# Patient Record
Sex: FEMALE | Race: WHITE | Employment: FULL TIME | URBAN - METROPOLITAN AREA
[De-identification: names, ages, dates, MRNs, and addresses within clinical notes are randomized per-mention and may not be internally consistent; named-entity substitution may affect disease eponyms.]

---

## 2018-01-18 LAB — HIV AG/AB: NORMAL

## 2020-01-28 ENCOUNTER — OFFICE VISIT (OUTPATIENT)
Dept: FAMILY MEDICINE CLINIC | Age: 35
End: 2020-01-28
Payer: COMMERCIAL

## 2020-01-28 VITALS
HEIGHT: 66 IN | RESPIRATION RATE: 16 BRPM | WEIGHT: 232 LBS | DIASTOLIC BLOOD PRESSURE: 70 MMHG | BODY MASS INDEX: 37.28 KG/M2 | HEART RATE: 100 BPM | OXYGEN SATURATION: 98 % | SYSTOLIC BLOOD PRESSURE: 108 MMHG

## 2020-01-28 DIAGNOSIS — Z00.00 PHYSICAL EXAM, ANNUAL: ICD-10-CM

## 2020-01-28 PROBLEM — B17.10 ACUTE HEPATITIS C VIRUS INFECTION WITHOUT HEPATIC COMA: Status: ACTIVE | Noted: 2020-01-28

## 2020-01-28 PROBLEM — F19.11 H/O DRUG ABUSE (HCC): Status: ACTIVE | Noted: 2020-01-28

## 2020-01-28 LAB
A/G RATIO: 1 (ref 1.1–2.2)
ALBUMIN SERPL-MCNC: 3.8 G/DL (ref 3.4–5)
ALP BLD-CCNC: 136 U/L (ref 40–129)
ALT SERPL-CCNC: 37 U/L (ref 10–40)
ANION GAP SERPL CALCULATED.3IONS-SCNC: 13 MMOL/L (ref 3–16)
AST SERPL-CCNC: 59 U/L (ref 15–37)
BASOPHILS ABSOLUTE: 0.1 K/UL (ref 0–0.2)
BASOPHILS RELATIVE PERCENT: 1 %
BILIRUB SERPL-MCNC: 0.3 MG/DL (ref 0–1)
BUN BLDV-MCNC: 7 MG/DL (ref 7–20)
CALCIUM SERPL-MCNC: 8.7 MG/DL (ref 8.3–10.6)
CHLORIDE BLD-SCNC: 100 MMOL/L (ref 99–110)
CHOLESTEROL, TOTAL: 192 MG/DL (ref 0–199)
CO2: 22 MMOL/L (ref 21–32)
CREAT SERPL-MCNC: 0.6 MG/DL (ref 0.6–1.1)
EOSINOPHILS ABSOLUTE: 0.4 K/UL (ref 0–0.6)
EOSINOPHILS RELATIVE PERCENT: 5.4 %
GFR AFRICAN AMERICAN: >60
GFR NON-AFRICAN AMERICAN: >60
GLOBULIN: 3.9 G/DL
GLUCOSE BLD-MCNC: 81 MG/DL (ref 70–99)
HCT VFR BLD CALC: 37.6 % (ref 36–48)
HDLC SERPL-MCNC: 54 MG/DL (ref 40–60)
HEMOGLOBIN: 12.3 G/DL (ref 12–16)
LDL CHOLESTEROL CALCULATED: 117 MG/DL
LYMPHOCYTES ABSOLUTE: 1.4 K/UL (ref 1–5.1)
LYMPHOCYTES RELATIVE PERCENT: 18.6 %
MCH RBC QN AUTO: 25.6 PG (ref 26–34)
MCHC RBC AUTO-ENTMCNC: 32.6 G/DL (ref 31–36)
MCV RBC AUTO: 78.4 FL (ref 80–100)
MONOCYTES ABSOLUTE: 0.4 K/UL (ref 0–1.3)
MONOCYTES RELATIVE PERCENT: 5.9 %
NEUTROPHILS ABSOLUTE: 5.1 K/UL (ref 1.7–7.7)
NEUTROPHILS RELATIVE PERCENT: 69.1 %
PDW BLD-RTO: 14.7 % (ref 12.4–15.4)
PLATELET # BLD: 251 K/UL (ref 135–450)
PMV BLD AUTO: 9.9 FL (ref 5–10.5)
POTASSIUM SERPL-SCNC: 4.1 MMOL/L (ref 3.5–5.1)
RBC # BLD: 4.8 M/UL (ref 4–5.2)
SODIUM BLD-SCNC: 135 MMOL/L (ref 136–145)
TOTAL PROTEIN: 7.7 G/DL (ref 6.4–8.2)
TRIGL SERPL-MCNC: 105 MG/DL (ref 0–150)
VLDLC SERPL CALC-MCNC: 21 MG/DL
WBC # BLD: 7.4 K/UL (ref 4–11)

## 2020-01-28 PROCEDURE — G8484 FLU IMMUNIZE NO ADMIN: HCPCS | Performed by: FAMILY MEDICINE

## 2020-01-28 PROCEDURE — 99385 PREV VISIT NEW AGE 18-39: CPT | Performed by: FAMILY MEDICINE

## 2020-01-28 RX ORDER — VENLAFAXINE 100 MG/1
100 TABLET ORAL 2 TIMES DAILY
Qty: 60 TABLET | Refills: 0 | Status: SHIPPED | OUTPATIENT
Start: 2020-01-28 | End: 2020-02-13

## 2020-01-28 RX ORDER — BUPRENORPHINE HYDROCHLORIDE AND NALOXONE HYDROCHLORIDE DIHYDRATE 8; 2 MG/1; MG/1
1 TABLET SUBLINGUAL DAILY
COMMUNITY

## 2020-01-28 RX ORDER — DICYCLOMINE HYDROCHLORIDE 10 MG/1
10 CAPSULE ORAL
COMMUNITY
End: 2021-03-18 | Stop reason: SDUPTHER

## 2020-01-28 RX ORDER — OMEPRAZOLE 40 MG/1
CAPSULE, DELAYED RELEASE ORAL
COMMUNITY
Start: 2020-01-20 | End: 2020-10-23

## 2020-01-28 RX ORDER — MONTELUKAST SODIUM 4 MG/1
1 TABLET, CHEWABLE ORAL 2 TIMES DAILY
COMMUNITY
End: 2022-06-05

## 2020-01-28 RX ORDER — BUPROPION HYDROCHLORIDE 150 MG/1
TABLET ORAL
COMMUNITY
Start: 2020-01-20 | End: 2020-01-28

## 2020-01-28 RX ORDER — TAMSULOSIN HYDROCHLORIDE 0.4 MG/1
CAPSULE ORAL
COMMUNITY
Start: 2020-01-05 | End: 2020-01-28

## 2020-01-28 RX ORDER — VENLAFAXINE 100 MG/1
TABLET ORAL
COMMUNITY
Start: 2020-01-25 | End: 2020-01-28 | Stop reason: SDUPTHER

## 2020-01-28 RX ORDER — ALBUTEROL SULFATE 90 UG/1
2 AEROSOL, METERED RESPIRATORY (INHALATION) EVERY 6 HOURS PRN
COMMUNITY
End: 2020-10-12 | Stop reason: SDUPTHER

## 2020-01-28 ASSESSMENT — PATIENT HEALTH QUESTIONNAIRE - PHQ9
1. LITTLE INTEREST OR PLEASURE IN DOING THINGS: 0
SUM OF ALL RESPONSES TO PHQ QUESTIONS 1-9: 0
SUM OF ALL RESPONSES TO PHQ9 QUESTIONS 1 & 2: 0
2. FEELING DOWN, DEPRESSED OR HOPELESS: 0
SUM OF ALL RESPONSES TO PHQ QUESTIONS 1-9: 0

## 2020-01-28 NOTE — PROGRESS NOTES
Chief Complaint: New Patient and Irritable Bowel Syndrome       HPI: She is here to establish care. Previously she had history of IV drug abuse. But she quotes that she has been sober for more than 2 years. Her previous PCP dismissed her but she does not give clear reasoning. She states that she is on probation for child custody. And they check her urine for uds . She is unable to urinate and was unable to give them sample. She states that she hardly urinates and always been having difficulty to urinate. Her previous PCP wrote a letter to court that she was unable to urinate. She requests similar kind of her letter from me. Her labs from 2019 May have been normal.  No known kidney disease. Has history of hepatitis C and was getting acute treatment she wanted new GI doctor to continue the treatment as her previous doctor would not accept her insurance anymore. Has history of anxiety and depression and has been taking Effexor 100 mg twice a day. And she was taking Wellbutrin but she has been tapering the medication. She is currently going to [facility] rehab and she is also getting Suboxone. Patient's problem list, medications, allergies, past medical, surgical, social and family histories were reviewed and updated as appropriate. Current Outpatient Medications   Medication Sig Dispense Refill    omeprazole (PRILOSEC) 40 MG delayed release capsule TK 1 C PO D      colestipol (COLESTID) 1 g tablet Take 1 g by mouth 2 times daily      dicyclomine (BENTYL) 10 MG capsule Take 10 mg by mouth 4 times daily (before meals and nightly)      albuterol sulfate  (90 Base) MCG/ACT inhaler Inhale 2 puffs into the lungs every 6 hours as needed for Wheezing      buprenorphine-naloxone (SUBOXONE) 8-2 MG SUBL SL tablet Place 1 tablet under the tongue daily.       venlafaxine (EFFEXOR) 100 MG tablet Take 1 tablet by mouth 2 times daily 60 tablet 0    ibuprofen (IBU) 800 MG tablet Take 1 tablet by GI given  Javon Suarez  1/28/2020 3:17 PM

## 2020-02-01 ENCOUNTER — PATIENT MESSAGE (OUTPATIENT)
Dept: FAMILY MEDICINE CLINIC | Age: 35
End: 2020-02-01

## 2020-02-03 NOTE — TELEPHONE ENCOUNTER
From: Aylin Castellanos  To: Edelmira Hansen MD  Sent: 2/1/2020 2:32 PM EST  Subject: Test Results Question    I have a question about CBC W/ AUTO DIFFERENTIAL resulted on 1/28/20 at 8:20 PM. I seen that there is a few results that are low or borderline low and a few chester high levels, between all 3 blood test results.  Does the doc see any concerns with my results due to a few of my underlying symptoms and health issues

## 2020-02-13 RX ORDER — VENLAFAXINE 100 MG/1
100 TABLET ORAL 2 TIMES DAILY
Qty: 60 TABLET | Refills: 0 | Status: SHIPPED | OUTPATIENT
Start: 2020-02-13 | End: 2020-02-13 | Stop reason: SDUPTHER

## 2020-02-13 RX ORDER — VENLAFAXINE 100 MG/1
200 TABLET ORAL 2 TIMES DAILY
Qty: 20 TABLET | Refills: 0 | Status: SHIPPED | OUTPATIENT
Start: 2020-02-13 | End: 2020-02-14 | Stop reason: SDUPTHER

## 2020-02-19 ENCOUNTER — PATIENT MESSAGE (OUTPATIENT)
Dept: FAMILY MEDICINE CLINIC | Age: 35
End: 2020-02-19

## 2020-02-19 NOTE — TELEPHONE ENCOUNTER
From: Dipak Monge  To: Allison Kenyon MD  Sent: 2/19/2020 12:39 PM EST  Subject: Test Results Question      Thank you so much I had no clue. Havent heard from the pharmacy   ----- Message -----  From: Eamon Doveroche  Sent: 2/19/20 12:35 PM  To: Dipak Monge  Subject: RE: Test Results Question    [facility] -   This has been taken care of- please contact your pharmacy  :   venlafaxine [facility]) 100 MG tablet 120 tablet  2/14/2020    Sig: Take 2 tablets 2 times a day   Sent to pharmacy as: Venlafaxine HCl 100 MG Oral Tablet [facility])   E-Prescribing Status: Receipt confirmed by pharmacy (2/14/2020  3:17 PM EST)   Pharmacy: [facility], [address] [place] [address] [phone] [facility] [phone]          ----- Message -----   From: Dipak Monge   Sent: 2/19/2020 12:24 PM EST   To: Allison Kenyon MD  Subject: RE: Test Results Question      My 100mg effexor I take 2 times a day, 200mg morning and 200mg night. The script was wrote to the pharmacy wrong and I ran out way before I was sopost to. The pharmacy cant refill unless the script is fixed. It's the doses I have been taking for over a year and that my prior primary care at [facility] had continued to subscribe also. I am out of my script and its critical to not go cold turkey on this type of medication please  ----- Message -----  From: Allison Kenyon MD  Sent: 2/3/20 1:18 PM  To: Dipak Monge  Subject: RE: Test Results Question    Hi Ms Winston Stendal,    The sodium slightly low and can happen if you drink more water or happened to drink alcohol. And its not critically low.   And other blood work was normal too and did not concern with your underlying symptoms    Dr Mikhail Zapien      ----- Message -----   From: Dipak Monge   Sent: 2/1/2020 2:32 PM EST   To: Allison Kenyon MD  Subject: RE: Test Results Question    I have a question about CBC W/ AUTO DIFFERENTIAL resulted on 1/28/20 at 8:20 PM. I seen that there is a few results that are low or borderline low and a few chester high levels, between all 3 blood test results.  Does the doc see any concerns with my results due to a few of my underlying symptoms and health issues

## 2020-03-18 RX ORDER — VENLAFAXINE 100 MG/1
TABLET ORAL
Qty: 120 TABLET | Refills: 0 | Status: SHIPPED | OUTPATIENT
Start: 2020-03-18 | End: 2020-04-06 | Stop reason: SDUPTHER

## 2020-03-18 NOTE — TELEPHONE ENCOUNTER
Last office visit 1/28/2020     Last written 2/14/2020    Next office visit scheduled Visit date not found    Requested Prescriptions     Pending Prescriptions Disp Refills    venlafaxine (EFFEXOR) 100 MG tablet 120 tablet 0     Sig: Take 2 tablets 2 times a day

## 2020-04-06 ENCOUNTER — PATIENT MESSAGE (OUTPATIENT)
Dept: FAMILY MEDICINE CLINIC | Age: 35
End: 2020-04-06

## 2020-04-06 RX ORDER — VENLAFAXINE 100 MG/1
TABLET ORAL
Qty: 120 TABLET | Refills: 0 | Status: SHIPPED | OUTPATIENT
Start: 2020-04-06 | End: 2020-04-17

## 2020-04-06 NOTE — TELEPHONE ENCOUNTER
Medication:   Requested Prescriptions     Pending Prescriptions Disp Refills    venlafaxine (EFFEXOR) 100 MG tablet 120 tablet 0     Sig: Take 2 tablets 2 times a day        Last Filled:  3/18/2020 120 tab 0 refills     Patient Phone Number: [phone] (home)     Last appt: 1/28/2020   Next appt: Visit date not found    Last OARRS: No flowsheet data found.

## 2020-04-17 RX ORDER — VENLAFAXINE 100 MG/1
TABLET ORAL
Qty: 60 TABLET | Refills: 0 | Status: SHIPPED | OUTPATIENT
Start: 2020-04-17 | End: 2020-06-01

## 2020-06-01 RX ORDER — VENLAFAXINE 100 MG/1
TABLET ORAL
Qty: 120 TABLET | Refills: 0 | Status: SHIPPED | OUTPATIENT
Start: 2020-06-01 | End: 2020-06-29

## 2020-06-16 ENCOUNTER — PATIENT MESSAGE (OUTPATIENT)
Dept: FAMILY MEDICINE CLINIC | Age: 35
End: 2020-06-16

## 2020-06-17 NOTE — TELEPHONE ENCOUNTER
From: Fabiano Hester  To: Lien Carrillo MD  Sent: 6/16/2020 10:47 PM EDT  Subject: Non-Urgent Medical Question    I'm not feeling too well, sweating, nauseous, body aches and loose bowels. It hit me out of nowhere around 8pm tonight. I have not been tested for covid and I've been around alot of people lately due to baseball games and working. Can I get tested as soon as possible?

## 2020-06-29 RX ORDER — VENLAFAXINE 100 MG/1
TABLET ORAL
Qty: 120 TABLET | Refills: 0 | Status: SHIPPED | OUTPATIENT
Start: 2020-06-29 | End: 2020-07-27

## 2020-07-27 RX ORDER — VENLAFAXINE 100 MG/1
TABLET ORAL
Qty: 120 TABLET | Refills: 0 | Status: SHIPPED | OUTPATIENT
Start: 2020-07-27 | End: 2020-08-26

## 2020-07-27 NOTE — TELEPHONE ENCOUNTER
Medication:   Requested Prescriptions     Pending Prescriptions Disp Refills    venlafaxine (EFFEXOR) 100 MG tablet [Pharmacy Med Name: VENLAFAXINE  MG TABLET] 120 tablet 0     Sig: TAKE 2 TABLETS BY MOUTH TWICE A DAY        Last Filled:  6/29/2020 #120 w/0 refills    Patient Phone Number: [phone] (home)     Last appt: 1/28/2020   Next appt: Visit date not found    Last OARRS: No flowsheet data found.

## 2020-08-26 ENCOUNTER — PATIENT MESSAGE (OUTPATIENT)
Dept: FAMILY MEDICINE CLINIC | Age: 35
End: 2020-08-26

## 2020-08-26 RX ORDER — VENLAFAXINE 100 MG/1
TABLET ORAL
Qty: 120 TABLET | Refills: 0 | Status: SHIPPED | OUTPATIENT
Start: 2020-08-26 | End: 2020-09-09 | Stop reason: SDUPTHER

## 2020-08-26 NOTE — TELEPHONE ENCOUNTER
From: Mounika Sargent  To: ODETTE Ulrich  Sent: 8/26/2020 5:06 PM EDT  Subject: Appointment Reminder    Not the wellbutrin. It was Trazadone.       ----- Message -----   From:ODETTE Ulrich   Sent:8/26/2020 4:13 PM EDT   To:Sara Alfred Nyhan   Subject:RE: Appointment Reminder    Good afternoon LAHTI,   I have you scheduled for a video visit with Dr. Elsi Arriaga on Friday August 28,2020 at 2:45. I will call you a few minutes before your appointment to get you check in and explain on how to connect to the video visit. I have sent your prescription for the Effexor over to Dr. Yvonne Prabhakar approval. I have looked into your medications and I wasn't able to find the Trazodone 150 mg. The only prescription I found that was 150 mg was Bupropion (Wellbutrin). Did you mean Wellbutrin? Please let me know if you need to re schedule and if you meant the Wellbutrin. Have a great day! Thanks,    Breanna Dawson C.C.M.A., [address] [facility] Assistant for:     [facility], [address]  [place], [address]    P: [phone]  F: [phone]      ----- Message -----   From:Sara Alfred Nyhan   Sent:8/26/2020 1:35 PM EDT   To:ODETTE Sanchezkeley   Subject:RE: Appointment Reminder    Video visit would be best but whatever works with her schedule best because I would like to ask her to prescribe me my 150mg trazadone again. I don't take it every night I usually take it when needed to sleep. It works great but I ran out about a month ago and now having trouble staying asleep. When I seen her on my first visit I said I didn't need them refilled since I had some already but I would like them back please.      ----- Message -----   From:ODETTE REAGAN   Sent:8/26/2020 8:26 AM EDT   To:Sara Alfred Nyhan   Subject:RE: Appointment Reminder    Good morning LAHTI,  Would you like an office visit, or a video visit?     Thanks,  Иван Baumann      ----- Message -----   From:Gregoria Glover   Sent:8/26/2020 8:12 AM

## 2020-08-28 ENCOUNTER — VIRTUAL VISIT (OUTPATIENT)
Dept: FAMILY MEDICINE CLINIC | Age: 35
End: 2020-08-28
Payer: COMMERCIAL

## 2020-08-28 PROCEDURE — G8427 DOCREV CUR MEDS BY ELIG CLIN: HCPCS | Performed by: FAMILY MEDICINE

## 2020-08-28 PROCEDURE — 99213 OFFICE O/P EST LOW 20 MIN: CPT | Performed by: FAMILY MEDICINE

## 2020-08-28 RX ORDER — TRAZODONE HYDROCHLORIDE 50 MG/1
100 TABLET ORAL NIGHTLY PRN
Qty: 60 TABLET | Refills: 5 | Status: SHIPPED | OUTPATIENT
Start: 2020-08-28 | End: 2021-03-08

## 2020-08-28 NOTE — PROGRESS NOTES
2020    TELEHEALTH EVALUATION -- Audio/Visual (During JFVTJ-19 public health emergency)    HPI:    Won Selby (:  1985) has requested an audio/video evaluation for the following concern(s): She is here for insomnia. She was taking trazodone in the past to help her sleep  And now she is not taking  It is affecting her sleep  She has been taking Effexor 100 mg twice a day for her anxiety and depression  Symptoms are well controlled denies any concern    She wanted to restart trazodone. Review of Systems:  Gen:  Denies fever, chills, headaches. No weight loss  HEENT:  Denies cold symptoms, sore throat. CV:  Denies chest pain or tightness, palpitations. Pulm:  Denies shortness of breath, cough. Abd:  Denies abdominal pain, change in bowel habits. Prior to Visit Medications    Medication Sig Taking? Authorizing Provider   traZODone (DESYREL) 50 MG tablet Take 2 tablets by mouth nightly as needed for Sleep Yes Dipak Shepherd MD   venlafaxine (EFFEXOR) 100 MG tablet TAKE 2 TABLETS BY MOUTH TWICE A DAY Yes Antonino Walls MD   omeprazole (PRILOSEC) 40 MG delayed release capsule TK 1 C PO D Yes Historical Provider, MD   colestipol (COLESTID) 1 g tablet Take 1 g by mouth 2 times daily Yes Historical Provider, MD   dicyclomine (BENTYL) 10 MG capsule Take 10 mg by mouth 4 times daily (before meals and nightly) Yes Historical Provider, MD   albuterol sulfate  (90 Base) MCG/ACT inhaler Inhale 2 puffs into the lungs every 6 hours as needed for Wheezing Yes Historical Provider, MD   buprenorphine-naloxone (SUBOXONE) 8-2 MG SUBL SL tablet Place 1 tablet under the tongue daily. Yes Historical Provider, MD   ibuprofen (IBU) 800 MG tablet Take 1 tablet by mouth every 8 hours as needed for Pain.   Ricardo Guan MD       Past Medical History:   Diagnosis Date    Hepatitis C     Heroin abuse (Eliana Alexandra)     IBS (irritable bowel syndrome)     PID        Past Surgical History:   Procedure Laterality Date    CHOLECYSTECTOMY      TUBAL LIGATION         Family History   Problem Relation Age of Onset    Hypothyroidism Mother     Diabetes Father     Heart Disease Father     High Blood Pressure Father     High Cholesterol Father        Allergies   Allergen Reactions    Acetaminophen      Hep C    Hydrocodone-Acetaminophen Itching    Prednisone Other (See Comments)     Per patient- all over body pain. Social History     Tobacco Use    Smoking status: Former Smoker     Packs/day: 0.50     Types: Cigarettes    Smokeless tobacco: Never Used   Substance Use Topics    Alcohol use: No    Drug use: No          PHYSICAL EXAMINATION:  Vital Signs:not checked    Respiratory rate appears normal      Constitutional: Appears well-developed and well-nourished. No apparent distress    Mental status: Alert and awake. Oriented to person/place/time. Able to follow commands    Eyes: EOM normal. Sclera normal. No discharge visible  HENT: Normocephalic, atraumatic. Mouth/Throat: Mucous membranes are moist. External Ears Normal    Neck: No visualized mass   Pulmonary/Chest: Respiratory effort normal.  No visualized signs of difficulty breathing or respiratory distress        Musculoskeletal:  Normal range of motion of neck    Psychiatric: Normal Affect. No Hallucinations            ASSESSMENT/PLAN:  1. Primary insomnia  Started on trazodone 100 mg daily     2 Anxiety and depression  Stable and we will continue the same        Pierre Washington is a 30 y.o. female being evaluated by a Virtual Visit (video visit) encounter to address concerns as mentioned above. A caregiver was present when appropriate. Due to this being a TeleHealth encounter (During [place] [address] public health emergency), evaluation of the following organ systems was limited: Vitals/Constitutional/EENT/Resp/CV/GI//MS/Neuro/Skin/Heme-Lymph-Imm.   Pursuant to the emergency declaration under the [address] [place], [address] waiver authority and the [employer] Act, this Virtual Visit was conducted with patient's (and/or legal guardian's) consent, to reduce the patient's risk of exposure to COVID-19 and provide necessary medical care. The patient (and/or legal guardian) has also been advised to contact this office for worsening conditions or problems, and seek emergency medical treatment and/or call 911 if deemed necessary. Patient identification was verified at the start of the visit: Yes    Total time spent on this encounter: 10 min minutes. Services were provided through a video synchronous discussion virtually to substitute for in-person clinic visit. Patient was located in their home. Provider was located in the office. --Veronique Aranda MD on 8/28/2020 at 3:19 PM    An electronic signature was used to authenticate this note. Jaylon Wagner

## 2020-09-01 ENCOUNTER — PATIENT MESSAGE (OUTPATIENT)
Dept: FAMILY MEDICINE CLINIC | Age: 35
End: 2020-09-01

## 2020-09-01 NOTE — TELEPHONE ENCOUNTER
From: Arin Quintana  To: Ervin Lara MD  Sent: 9/1/2020 2:43 PM EDT  Subject: Non-Urgent Medical Question    Starting last night I started getting weak, cold sweats then hot flashes and vomited 3 times late last night. Got up today ate a light breakfast and still can't keep anything down. I feel weaker and very tired.

## 2020-09-01 NOTE — LETTER
[facility] [address] [facility]  Phone: [phone]  Fax: [phone]    Veronique Aranda MD        September 2, 2020     Patient: Carlos Gordon   YOB: 1985   Date of Visit: 9/1/2020       To Whom it May Concern:    Carlos Gordon has symptoms of Covid-19. It's my professional opinion that she self quarantine for 14 days, until were able to get her test results back. If you have any questions or concerns, please don't hesitate to call.     Sincerely,         Veronique Aranda MD

## 2020-09-02 NOTE — TELEPHONE ENCOUNTER
Sent [employer] message to schedule patient for VV with Dr Jerome Mccormick today

## 2020-09-08 ENCOUNTER — PATIENT MESSAGE (OUTPATIENT)
Dept: FAMILY MEDICINE CLINIC | Age: 35
End: 2020-09-08

## 2020-09-08 NOTE — TELEPHONE ENCOUNTER
From: Chance Painting  To: Miguel Miller MD  Sent: 9/8/2020 12:42 PM EDT  Subject: Non-Urgent Medical Question    I had some car issues I got my car back from the shop this morning, I have stayed quarantined by myself. I am calling now to set up my covid test. Still very sick       ----- Message -----   Richard Roland MD   Sent:9/3/2020 8:43 AM EDT   To:Gregoria Glover   Subject:RE: Non-Urgent Medical Question    Please notify me when you get tested for covid 19    Dr Willian Ayala      ----- Message -----   From:Gregoria Hoffman   Sent:9/1/2020 11:14 PM EDT   Lian Pisano MD   Subject:RE: Non-Urgent Medical Question      Make arrangements to get me changed? I apologize i don't know what that means. I'm taking advil and Tylenol to try to keep my temperature down last temp I took was a low grade temp 99.9      ----- Message -----   From:Argentina Nieves MD   Sent:9/1/2020 6:19 PM EDT   To:Gregoria Hoffman   Subject:RE: Non-Urgent Medical Question    Are these new symptoms? I am concerned about covid 19 and should I make my arrangements to get you changed  Dr Willian Ayala      ----- Message -----   From:Gregoria Hoffman   Sent:9/1/2020 2:43 PM EDT   Lian Pisano MD   Subject:Non-Urgent Medical Question    Starting last night I started getting weak, cold sweats then hot flashes and vomited 3 times late last night. Got up today ate a light breakfast and still can't keep anything down. I feel weaker and very tired.

## 2020-09-09 RX ORDER — VENLAFAXINE 100 MG/1
TABLET ORAL
Qty: 120 TABLET | Refills: 0 | Status: SHIPPED | OUTPATIENT
Start: 2020-09-09 | End: 2020-10-23 | Stop reason: SDUPTHER

## 2020-09-09 RX ORDER — VENLAFAXINE 100 MG/1
TABLET ORAL
Qty: 120 TABLET | Refills: 0 | Status: SHIPPED | OUTPATIENT
Start: 2020-09-09 | End: 2020-11-06

## 2020-09-09 NOTE — TELEPHONE ENCOUNTER
Medication:   Requested Prescriptions     Pending Prescriptions Disp Refills    venlafaxine (EFFEXOR) 100 MG tablet 120 tablet 0     Sig: TAKE 2 TABLETS BY MOUTH TWICE A DAY        Last Filled:  8/26/2020 #120 Refills 0    Patient Phone Number: [phone] (home)     Last appt: 8/28/2020   Next appt: Visit date not found    Last OARRS: No flowsheet data found.

## 2020-09-09 NOTE — TELEPHONE ENCOUNTER
Medication:   Requested Prescriptions     Refused Prescriptions Disp Refills    venlafaxine (EFFEXOR) 100 MG tablet [Pharmacy Med Name: VENLAFAXINE  MG TABLET] 120 tablet 0     Sig: TAKE 2 TABLETS BY MOUTH TWICE A DAY        Last Filled:  8/26/2020 #120  Refills 0    Patient Phone Number: [phone] (home)     Last appt: 8/28/2020   Next appt: Visit date not found    Last OARRS: No flowsheet data found.

## 2020-09-10 ENCOUNTER — TELEPHONE (OUTPATIENT)
Dept: FAMILY MEDICINE CLINIC | Age: 35
End: 2020-09-10

## 2020-09-10 ENCOUNTER — OFFICE VISIT (OUTPATIENT)
Dept: PRIMARY CARE CLINIC | Age: 35
End: 2020-09-10

## 2020-09-10 PROCEDURE — 99211 OFF/OP EST MAY X REQ PHY/QHP: CPT | Performed by: NURSE PRACTITIONER

## 2020-09-10 NOTE — PROGRESS NOTES
Christopher Yojana received a viral test for COVID-19. They were educated on isolation and quarantine as appropriate. For any symptoms, they were directed to seek care from their PCP, given contact information to establish with a doctor, directed to an urgent care or the emergency room.

## 2020-09-10 NOTE — PATIENT INSTRUCTIONS

## 2020-09-11 NOTE — TELEPHONE ENCOUNTER
Spoke with pharmacy tech, they want to know if it's okay to dispense anti acid plus instead of the Aluminum Hydroxide.     Please advise, thanks

## 2020-09-12 LAB — SARS-COV-2, NAA: NOT DETECTED

## 2020-09-14 ENCOUNTER — PATIENT MESSAGE (OUTPATIENT)
Dept: FAMILY MEDICINE CLINIC | Age: 35
End: 2020-09-14

## 2020-09-14 NOTE — TELEPHONE ENCOUNTER
Pelon Cruz I know you were working on it and please call her and find out how she wants to letter to be sent

## 2020-09-27 ENCOUNTER — PATIENT MESSAGE (OUTPATIENT)
Dept: FAMILY MEDICINE CLINIC | Age: 35
End: 2020-09-27

## 2020-09-28 NOTE — TELEPHONE ENCOUNTER
From: Kelly Kim  To: Josue Alegre MD  Sent: 9/27/2020 3:09 PM EDT  Subject: Prescription Question    Taking effexor can cause excessive sweating, after doing some research and talking to someone else who also takes effexor there is a medicine that helps excessive sweating that you can take. Its 0.5mg of benzotropine. Ice been dealing with dripping sweat and its became a problem and embarrassing. No matter the weather or what I wear nothing helps. Effexor works well for me and I want to continue to stay on it but I'm asking if you could please prescribe 0.5 mg of benztropine to see if it can help me.  Please

## 2020-10-06 NOTE — TELEPHONE ENCOUNTER
Medication:   Requested Prescriptions     Pending Prescriptions Disp Refills    venlafaxine (EFFEXOR) 100 MG tablet [Pharmacy Med Name: VENLAFAXINE  MG TABLET] 120 tablet 0     Sig: TAKE 2 TABLETS BY MOUTH TWICE A DAY        Last Filled: 7/27/2020 #120 Refills 0     Patient Phone Number: [phone] (home)     Last appt: 1/28/2020   Next appt: Visit date not found (VV Appointment Reminder)    Last OARRS: No flowsheet data found. Patient needs to schedule an appointment. Patients procedure has been changed to 10/26.

## 2020-10-10 ENCOUNTER — PATIENT MESSAGE (OUTPATIENT)
Dept: FAMILY MEDICINE CLINIC | Age: 35
End: 2020-10-10

## 2020-10-12 RX ORDER — ALBUTEROL SULFATE 90 UG/1
2 AEROSOL, METERED RESPIRATORY (INHALATION) EVERY 6 HOURS PRN
Qty: 1 INHALER | Refills: 2 | Status: SHIPPED | OUTPATIENT
Start: 2020-10-12 | End: 2022-07-13 | Stop reason: SDUPTHER

## 2020-10-12 NOTE — TELEPHONE ENCOUNTER
Medication:   Requested Prescriptions     Pending Prescriptions Disp Refills    albuterol sulfate  (90 Base) MCG/ACT inhaler 1 Inhaler 2     Sig: Inhale 2 puffs into the lungs every 6 hours as needed for Wheezing        Last Filled:  Unknown     Patient Phone Number: [phone] (home)     Last appt: 8/28/2020   Next appt: Visit date not found    Last OARRS: No flowsheet data found.

## 2020-10-12 NOTE — TELEPHONE ENCOUNTER
From: Juan Chinchilla  To: Padmaja Lyon MD  Sent: 10/10/2020 2:38 PM EDT  Subject: Prescription Question    May i please have a refill of my inhaler. Its been about a year or so since my last refill on record but I'm finding myself short of breath lately due to weight gain.

## 2020-10-23 ENCOUNTER — OFFICE VISIT (OUTPATIENT)
Dept: FAMILY MEDICINE CLINIC | Age: 35
End: 2020-10-23
Payer: COMMERCIAL

## 2020-10-23 VITALS
HEART RATE: 117 BPM | SYSTOLIC BLOOD PRESSURE: 104 MMHG | DIASTOLIC BLOOD PRESSURE: 82 MMHG | HEIGHT: 66 IN | OXYGEN SATURATION: 97 % | WEIGHT: 246 LBS | BODY MASS INDEX: 39.53 KG/M2 | TEMPERATURE: 97.6 F

## 2020-10-23 PROCEDURE — G8482 FLU IMMUNIZE ORDER/ADMIN: HCPCS | Performed by: NURSE PRACTITIONER

## 2020-10-23 PROCEDURE — 90471 IMMUNIZATION ADMIN: CPT | Performed by: NURSE PRACTITIONER

## 2020-10-23 PROCEDURE — 90686 IIV4 VACC NO PRSV 0.5 ML IM: CPT | Performed by: NURSE PRACTITIONER

## 2020-10-23 PROCEDURE — 1036F TOBACCO NON-USER: CPT | Performed by: NURSE PRACTITIONER

## 2020-10-23 PROCEDURE — G8427 DOCREV CUR MEDS BY ELIG CLIN: HCPCS | Performed by: NURSE PRACTITIONER

## 2020-10-23 PROCEDURE — 99213 OFFICE O/P EST LOW 20 MIN: CPT | Performed by: NURSE PRACTITIONER

## 2020-10-23 PROCEDURE — G8417 CALC BMI ABV UP PARAM F/U: HCPCS | Performed by: NURSE PRACTITIONER

## 2020-10-23 RX ORDER — MELATONIN
1000 DAILY
Qty: 90 TABLET | Refills: 1 | Status: SHIPPED | OUTPATIENT
Start: 2020-10-23 | End: 2020-10-27

## 2020-10-23 NOTE — PROGRESS NOTES
[facility] PRIMARY CARE  [facility] [id number]  Phone: [phone]    10/23/2020    Odette Foster (:  1985) is a 30 y.o. female, here for evaluation of the following medical concerns:    HPI  Rectocele  Is coming from bottom  Has had really bad constipation; gets really backed up  Felt something there, would go away when stood up x 1 year  Tuesday felt like she felt something hanging out  Yesterday had to push it back in  Didn't want to stay in, felt like it was dry  Dx with IBS-C, can have diarrhea  Takes suboxone    Depression/ anxiety  Was started on Effexor in the [facility]; 2018  Has social anxiety- does not like big crowds  Effexor XR- works well, but has excessive sweating all over  Has tried other medications over the years    Clean x 3 years    Review of Systems   Constitutional: Negative for activity change, appetite change and fever. Gastrointestinal: Positive for constipation. Rectocele   Neurological: Negative for dizziness and headaches. Psychiatric/Behavioral: Positive for sleep disturbance. The patient is nervous/anxious. Depression       Prior to Visit Medications    Medication Sig Taking?  Authorizing Provider   aluminum hydroxide-magnesium carbonate (GENATON)  MG/15ML suspension Take 15 mLs by mouth 4 times daily (with meals and nightly) Yes VY Patel CNP   vitamin D3 (CHOLECALCIFEROL) 25 MCG (1000 UT) TABS tablet Take 1 tablet by mouth daily Yes VY Patel CNP   albuterol sulfate  (90 Base) MCG/ACT inhaler Inhale 2 puffs into the lungs every 6 hours as needed for Wheezing Yes Heather Jung MD   venlafaxine (EFFEXOR) 100 MG tablet TAKE 2 TABLETS BY MOUTH TWICE A DAY Yes Heather Jung MD   traZODone (DESYREL) 50 MG tablet Take 2 tablets by mouth nightly as needed for Sleep Yes Heather Jung MD   colestipol (COLESTID) 1 g tablet Take 1 g by mouth 2 times daily Yes Historical Provider, MD   dicyclomine (BENTYL) 10 MG capsule Take 10 mg by mouth 4 times daily (before meals and nightly) Yes Historical Provider, MD   buprenorphine-naloxone (SUBOXONE) 8-2 MG SUBL SL tablet Place 1 tablet under the tongue daily. Yes Historical Provider, MD   ibuprofen (IBU) 800 MG tablet Take 1 tablet by mouth every 8 hours as needed for Pain. Yes Dominga Friend MD        Allergies   Allergen Reactions    Acetaminophen      Hep C    Hydrocodone-Acetaminophen Itching    Prednisone Other (See Comments)     Per patient- all over body pain.         Past Medical History:   Diagnosis Date    Hepatitis C     Heroin abuse ([facility] [address]     IBS (irritable bowel syndrome)     PID        Past Surgical History:   Procedure Laterality Date    CHOLECYSTECTOMY      TUBAL LIGATION         Social History     Socioeconomic History    Marital status: Single     Spouse name: Not on file    Number of children: Not on file    Years of education: Not on file    Highest education level: Not on file   Occupational History    Not on file   Social Needs    Financial resource strain: Not on file    Food insecurity     Worry: Not on file     Inability: Not on file    Transportation needs     Medical: Not on file     Non-medical: Not on file   Tobacco Use    Smoking status: Former Smoker     Packs/day: 0.50     Types: Cigarettes    Smokeless tobacco: Never Used   Substance and Sexual Activity    Alcohol use: No    Drug use: No    Sexual activity: Yes     Partners: Male   Lifestyle    Physical activity     Days per week: Not on file     Minutes per session: Not on file    Stress: Not on file   Relationships    Social connections     Talks on phone: Not on file     Gets together: Not on file     Attends L[employer] service: Not on file     Active member of club or organization: Not on file     Attends meetings of clubs or organizations: Not on file     Relationship status: Not on file   Zannie Cowden Intimate partner violence     Fear of current or ex partner: Not on file     Emotionally abused: Not on file     Physically abused: Not on file     Forced sexual activity: Not on file   Other Topics Concern    Not on file   Social History Narrative    Not on file        Family History   Problem Relation Age of Onset    Hypothyroidism Mother     Diabetes Father     Heart Disease Father     High Blood Pressure Father     High Cholesterol Father        Vitals:    10/23/20 0804   BP: 104/82   Site: Left Upper Arm   Position: Sitting   Cuff Size: Large Adult   Pulse: 117   Temp: 97.6 °F (36.4 °C)   TempSrc: Infrared   SpO2: 97%   Weight: 246 lb (111.6 kg)   Height: 5' 6\" (1.676 m)     Estimated body mass index is 39.71 kg/m² as calculated from the following:    Height as of this encounter: 5' 6\" (1.676 m). Weight as of this encounter: 246 lb (111.6 kg). Physical Exam  Constitutional:       Appearance: Normal appearance. HENT:      Head: Normocephalic. Cardiovascular:      Rate and Rhythm: Normal rate and regular rhythm. Pulses: Normal pulses. Heart sounds: Normal heart sounds, S1 normal and S2 normal.   Pulmonary:      Effort: Pulmonary effort is normal.      Breath sounds: Normal breath sounds and air entry. Genitourinary:     Comments: Rectocele per picture  Neurological:      Mental Status: She is alert. Psychiatric:         Mood and Affect: Mood normal.         ASSESSMENT/PLAN:  1. Rectocele  Stable;  Referral to Dr. Rob Lynne. - Judith Price MD, General Surgery, [facility]    2. Excessive sweating  Stable;  Discussed could consider changing Effexor XR. Will see if dermatology can make any further recommendations. - External Referral To Dermatology    3. Healthcare maintenance  Stable;  Immunization counseling for flu vaccine.   - INFLUENZA, QUADV, 3 YRS AND OLDER, IM PF, PREFILL SYR OR SDV, 0.5ML (AFLURIA QUADV, PF)      Follow Up:  Return in about 4 weeks (around 11/20/2020) for wellness exam.

## 2020-10-23 NOTE — LETTER
[facility] Primary Care  [address] [place] [address],[address]  Phone: [phone]  Fax: [phone]    VY Watkins - CNP        October 23, 2020     Patient: Ruperto Whaley   YOB: 1985   Date of Visit: 10/23/2020       To Whom it May Concern:    Ruperto Whaley was seen in my clinic on 10/23/2020. She has a rectocele and it is recommended for her to see a GI/general surgeon. If you have any questions or concerns, please don't hesitate to call.     Sincerely,           VY Watkins - CNP

## 2020-10-24 ASSESSMENT — ENCOUNTER SYMPTOMS: CONSTIPATION: 1

## 2020-10-26 ENCOUNTER — TELEPHONE (OUTPATIENT)
Dept: SURGERY | Age: 35
End: 2020-10-26

## 2020-10-26 NOTE — TELEPHONE ENCOUNTER
New pt called to schedule appt for N81.6 (ICD-10-CM) - Rectocele, referred by Sindhu Richmond. How soon should she be seen? Please call.

## 2020-10-27 RX ORDER — CHOLECALCIFEROL (VITAMIN D3) 50 MCG
2000 TABLET ORAL DAILY
Qty: 90 TABLET | Refills: 1 | Status: SHIPPED | OUTPATIENT
Start: 2020-10-27 | End: 2021-02-24 | Stop reason: SDUPTHER

## 2020-11-03 ENCOUNTER — OFFICE VISIT (OUTPATIENT)
Dept: SURGERY | Age: 35
End: 2020-11-03
Payer: COMMERCIAL

## 2020-11-03 VITALS
BODY MASS INDEX: 40.18 KG/M2 | SYSTOLIC BLOOD PRESSURE: 124 MMHG | TEMPERATURE: 97.1 F | HEART RATE: 92 BPM | HEIGHT: 66 IN | WEIGHT: 250 LBS | DIASTOLIC BLOOD PRESSURE: 88 MMHG | OXYGEN SATURATION: 98 %

## 2020-11-03 PROCEDURE — G8482 FLU IMMUNIZE ORDER/ADMIN: HCPCS | Performed by: SURGERY

## 2020-11-03 PROCEDURE — G8417 CALC BMI ABV UP PARAM F/U: HCPCS | Performed by: SURGERY

## 2020-11-03 PROCEDURE — G8427 DOCREV CUR MEDS BY ELIG CLIN: HCPCS | Performed by: SURGERY

## 2020-11-03 PROCEDURE — 46600 DIAGNOSTIC ANOSCOPY SPX: CPT | Performed by: SURGERY

## 2020-11-03 PROCEDURE — 1036F TOBACCO NON-USER: CPT | Performed by: SURGERY

## 2020-11-03 PROCEDURE — 99203 OFFICE O/P NEW LOW 30 MIN: CPT | Performed by: SURGERY

## 2020-11-03 NOTE — PROGRESS NOTES
negative. Objective:     Physical Exam   /88   Pulse 92   Temp 97.1 °F (36.2 °C) (Infrared)   Ht 5' 6\" (1.676 m)   Wt 250 lb (113.4 kg)   LMP 10/20/2020 (Exact Date)   SpO2 98%   BMI 40.35 kg/m²   Constitutional: Appears well-developed and well-nourished. Grooming appropriate. No gross deformities. Body mass index is 40.35 kg/m². Eyes: No scleral icterus. Conjunctiva/lids normal. Vision intact grossly. Pupils equal/symmetric, reactive bilaterally. ENT: External ears/nose without defect, scars, or masses. Hearing grossly intact. No facial deformity. Lips normal, normal dentition. Neck: No masses. Trachea midline. No crepitus. Thyroid not enlarged. Cardiovascular: Normal rate. No peripheral edema. Abdominal aorta normal size to palpation. Pulmonary/Chest: Effort normal. No respiratory distress. No wheezes. No use of accessory muscles. Musculoskeletal: Normal range of motion x all 4 extremities and head/neck, without deformity, pain, or crepitus, with normal strength and tone. Normal gait. Nails without clubbing or cyanosis. Neurological: Alert and oriented to person, place, and time. No gross deficits. Sensation intact. Skin: Skin is dry. No rashes noted. No pallor. No induration of nodules. Psychiatric: Normal mood and affect. Behavior normal. Oriented to person, place, and time. Judgment and insight reasonable. Abdominal/wound: soft, obese, nontneder    During my initial anorectal exam I was unable to obtain clear visualization of the anal canal, so I determined an anoscopy would be required. I discussed with the patient and they agreed to proceed with anoscopy. ANOSCOPY:    Chaperone/MA present in room during entire exam. Patient was placed in knee-chest position or left side down position depending on comfort. Exam table manipulated for proper visualization and lighting. Buttocks spread. Inspection reveals: no perianal skin lesions, excoriation, or skin tags.      Digital exam performed with lubricated finger revealing: no masses, induration, or tenderness. No gross blood. Decreased tone. (+) moderate rectocele     Lubricated anoscope inserted gently into anus and withdrawn for visualization of distal rectum and anal canal: Mucosa appeared normal, without masses or evidence of proctitis. Normal hemorrhoidal tissue visible. No bleeding noted. Anoscope removed without difficulty. Labs reviewed: CBC, chem reviewed from 1/28/20  Radiology reviewed: none    Last colonoscopy: none      Assessment/Plan:     A/P:  New problem(s): Rectocele; pelvic constipation  Established problem(s): Hepatitis C  Additional workup/treatment planned: pelvic floor PT, medical mngmt, surgical referral to pelvic floor GYN, colonoscopy in future  Risk of complications/morbidity: high    I had a long discussion with Vinay Wakefield in the office regarding rectocele and pelvic floor dysfunction. We discussed the pathophysiology, etiology, natural history, work-up, treatment options. I suspect that much of this is due to her previous vaginal deliveries including episiotomies and repairs in the remote past.    We discussed starting with medical management, including fiber, stool softeners to keep stools regular. We discussed pelvic floor physical therapy to help strengthen her pelvic muscles. I discussed referral to Dr. Rose Carr of pelvic floor gynecology for consideration of surgical repair down the road. We also discussed given her family history and her rectal bleeding, colonoscopy would not be unreasonable. She is already seeing Dr. Lozada Arabia of [address] for her hepatitis, so this should be fairly easy to arrange.     Continue with current medications for hep C    DISPOSITION:  Pelvic floor DC, referral to [facility]    My findings will be relayed to consulting practitioner or PCP via Epic    Total encounter time:  35 min with > 50% spent with face-to-face counseling and coordination of care, including: Discussion, counseling, coordination of care as above    Note completed using dictation software, please excuse any errors.     Electronically signed by Steven Soriano MD on 11/3/2020 at 12:53 PM

## 2020-11-03 NOTE — Clinical Note
Balbir Kline,    Thank so much for sending Ms Anabell Ureña my way. We will send her to pelvic floor physical therapy and Erasmo Regan of pelvic floor gynecology for further treatment of her rectocele. Probably not a bad idea for her to have a colonoscopy with Dr. Dyan Jewell at some point given her family history as well. Thanks!   Ryan Sotelo

## 2020-11-04 ENCOUNTER — TELEPHONE (OUTPATIENT)
Dept: UROGYNECOLOGY | Age: 35
End: 2020-11-04

## 2020-11-06 ENCOUNTER — TELEPHONE (OUTPATIENT)
Dept: UROGYNECOLOGY | Age: 35
End: 2020-11-06

## 2020-11-06 RX ORDER — METRONIDAZOLE 500 MG/1
500 TABLET ORAL 2 TIMES DAILY
Qty: 14 TABLET | Refills: 0 | Status: SHIPPED | OUTPATIENT
Start: 2020-11-06 | End: 2020-11-13

## 2020-11-06 RX ORDER — DOCUSATE SODIUM 100 MG/1
100 CAPSULE, LIQUID FILLED ORAL DAILY PRN
Qty: 30 CAPSULE | Refills: 0 | Status: SHIPPED | OUTPATIENT
Start: 2020-11-06 | End: 2020-12-17

## 2020-11-06 RX ORDER — VENLAFAXINE 100 MG/1
TABLET ORAL
Qty: 120 TABLET | Refills: 0 | Status: SHIPPED | OUTPATIENT
Start: 2020-11-06 | End: 2020-12-17

## 2020-11-10 RX ORDER — ALUMINUM HYDROXIDE AND MAGNESIUM CARBONATE 254; 237.5 MG/5ML; MG/5ML
10 LIQUID ORAL 4 TIMES DAILY
Qty: 1200 ML | Refills: 0 | Status: SHIPPED | OUTPATIENT
Start: 2020-11-10 | End: 2020-12-17 | Stop reason: SDUPTHER

## 2020-11-17 ENCOUNTER — OFFICE VISIT (OUTPATIENT)
Dept: UROGYNECOLOGY | Age: 35
End: 2020-11-17
Payer: COMMERCIAL

## 2020-11-17 VITALS
OXYGEN SATURATION: 97 % | DIASTOLIC BLOOD PRESSURE: 72 MMHG | SYSTOLIC BLOOD PRESSURE: 130 MMHG | HEART RATE: 79 BPM | RESPIRATION RATE: 18 BRPM | TEMPERATURE: 96.5 F

## 2020-11-17 LAB
BILIRUBIN, POC: NORMAL
BLOOD URINE, POC: NORMAL
CLARITY, POC: CLEAR
COLOR, POC: YELLOW
GLUCOSE URINE, POC: NORMAL
KETONES, POC: NORMAL
LEUKOCYTE EST, POC: NORMAL
NITRITE, POC: NORMAL
PH, POC: 6.5
PROTEIN, POC: NORMAL
SPECIFIC GRAVITY, POC: 1.01
UROBILINOGEN, POC: NORMAL

## 2020-11-17 PROCEDURE — 99244 OFF/OP CNSLTJ NEW/EST MOD 40: CPT | Performed by: OBSTETRICS & GYNECOLOGY

## 2020-11-17 PROCEDURE — 81002 URINALYSIS NONAUTO W/O SCOPE: CPT | Performed by: OBSTETRICS & GYNECOLOGY

## 2020-11-17 PROCEDURE — G8427 DOCREV CUR MEDS BY ELIG CLIN: HCPCS | Performed by: OBSTETRICS & GYNECOLOGY

## 2020-11-17 PROCEDURE — G8482 FLU IMMUNIZE ORDER/ADMIN: HCPCS | Performed by: OBSTETRICS & GYNECOLOGY

## 2020-11-17 PROCEDURE — G8417 CALC BMI ABV UP PARAM F/U: HCPCS | Performed by: OBSTETRICS & GYNECOLOGY

## 2020-11-17 ASSESSMENT — ENCOUNTER SYMPTOMS
SHORTNESS OF BREATH: 1
RECTAL PAIN: 1
ANAL BLEEDING: 1
CONSTIPATION: 1
BACK PAIN: 1
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
## 2020-11-17 NOTE — LETTER
[address] [place] [address] Urogynecology  [address] [facility]  Phone: [phone]  Fax: [phone]    Laurita Sharp MD        November 17, 2020     Patient: Odette Foster   YOB: 1985   Date of Visit: 11/17/2020       To Whom It May Concern: It is my medical opinion that Odette Foster may return to work on 11/18/20. If you have any questions or concerns, please don't hesitate to call.     Sincerely,        Laurita Sharp MD

## 2020-12-10 ENCOUNTER — VIRTUAL VISIT (OUTPATIENT)
Dept: FAMILY MEDICINE CLINIC | Age: 35
End: 2020-12-10
Payer: COMMERCIAL

## 2020-12-10 PROCEDURE — 99213 OFFICE O/P EST LOW 20 MIN: CPT | Performed by: NURSE PRACTITIONER

## 2020-12-10 PROCEDURE — G8417 CALC BMI ABV UP PARAM F/U: HCPCS | Performed by: NURSE PRACTITIONER

## 2020-12-10 PROCEDURE — G8427 DOCREV CUR MEDS BY ELIG CLIN: HCPCS | Performed by: NURSE PRACTITIONER

## 2020-12-10 PROCEDURE — G8482 FLU IMMUNIZE ORDER/ADMIN: HCPCS | Performed by: NURSE PRACTITIONER

## 2020-12-10 PROCEDURE — 1036F TOBACCO NON-USER: CPT | Performed by: NURSE PRACTITIONER

## 2020-12-10 ASSESSMENT — ENCOUNTER SYMPTOMS
BLOOD IN STOOL: 0
CONSTIPATION: 1
ABDOMINAL PAIN: 1

## 2020-12-10 NOTE — PROGRESS NOTES
12/10/2020    TELEHEALTH EVALUATION -- Audio/Visual (During DRZER-95 public health emergency)    HPI:    Carlotta Espitia (:  1985) has requested an audio/video evaluation for the following concern(s):    Rectocele  Went well with specialist appointments  Eating oatmeal, fruits and vegetables  Has not scheduled with pelvic floor PT (just got off quarantine from her cousin- +COVID)  Dr. Ciro Heller encouraged fiber and stool softener (taking Colace)- is going (used to go 4-5 days or longer)  Saw Dr. Guerda Casillas- hadn't had a BM x 5 days, feels like the rectocele has gotten worse  Can get abdominal/ pelvic pain- if over does it    Cleans houses to make money    Review of Systems   Constitutional: Negative for activity change and appetite change. Gastrointestinal: Positive for abdominal pain and constipation. Negative for blood in stool. Rectocele   Genitourinary: Positive for difficulty urinating. Neurological: Negative for dizziness and headaches. Prior to Visit Medications    Medication Sig Taking?  Authorizing Provider   Alum Hydroxide-Mag Carbonate (GAVISCON EXTRA STRENGTH) 039-258 MG/10ML SUSP Take 10 mLs by mouth 4 times daily Yes VY Winn CNP   venlafaxine (EFFEXOR) 100 MG tablet TAKE 2 TABLETS BY MOUTH TWICE A DAY Yes VY Winn CNP   docusate sodium (COLACE) 100 MG capsule Take 1 capsule by mouth daily as needed for Constipation Yes VY Winn CNP   vitamin D (CHOLECALCIFEROL) 50 MCG (2000 UT) TABS tablet Take 1 tablet by mouth daily Yes VY Winn CNP   albuterol sulfate  (90 Base) MCG/ACT inhaler Inhale 2 puffs into the lungs every 6 hours as needed for Wheezing Yes Germain Magana MD   traZODone (DESYREL) 50 MG tablet Take 2 tablets by mouth nightly as needed for Sleep Yes Germain Magana MD   colestipol (COLESTID) 1 g tablet Take 1 g by mouth 2 times daily Yes Historical Provider, MD Skin:        [] No significant exanthematous lesions or discoloration noted on facial skin         [] Abnormal-            Psychiatric:       [x] Normal Affect [] No Hallucinations        [] Abnormal-     Other pertinent observable physical exam findings-     ASSESSMENT/PLAN:  1. Rectocele  Stable;  Continue colace and fiber. Encouraged patient to schedule with Pelvic floor PT. Discussed cannot write a letter stating she is unable to work, can discuss further with specialists if necessary. Return in about 3 months (around 3/10/2021). Tisha Moore is a 30 y.o. female being evaluated by a Virtual Visit (video visit) encounter to address concerns as mentioned above. A caregiver was present when appropriate. Due to this being a TeleHealth encounter (During [facility]- public health emergency), evaluation of the following organ systems was limited: Vitals/Constitutional/EENT/Resp/CV/GI//MS/Neuro/Skin/Heme-Lymph-Imm. Pursuant to the emergency declaration under the [address] [place] [address] authority and the [employer] Act, this Virtual Visit was conducted with patient's (and/or legal guardian's) consent, to reduce the patient's risk of exposure to COVID-19 and provide necessary medical care. The patient (and/or legal guardian) has also been advised to contact this office for worsening conditions or problems, and seek emergency medical treatment and/or call 911 if deemed necessary. Patient identification was verified at the start of the visit: Yes    Total time spent on this encounter: Not billed by time    Services were provided through a video synchronous discussion virtually to substitute for in-person clinic visit. Patient and provider were located at their individual homes. --VY Ardon - CNP on 12/13/2020 at 1:04 PM    An electronic signature was used to authenticate this note.

## 2020-12-17 ENCOUNTER — PATIENT MESSAGE (OUTPATIENT)
Dept: FAMILY MEDICINE CLINIC | Age: 35
End: 2020-12-17

## 2020-12-17 RX ORDER — DOCUSATE SODIUM 100 MG
CAPSULE ORAL
Qty: 30 CAPSULE | Refills: 0 | Status: SHIPPED | OUTPATIENT
Start: 2020-12-17 | End: 2021-03-08

## 2020-12-17 RX ORDER — VENLAFAXINE 100 MG/1
TABLET ORAL
Qty: 120 TABLET | Refills: 0 | Status: SHIPPED | OUTPATIENT
Start: 2020-12-17 | End: 2021-01-13

## 2020-12-17 RX ORDER — ALUMINUM HYDROXIDE AND MAGNESIUM CARBONATE 254; 237.5 MG/5ML; MG/5ML
10 LIQUID ORAL 4 TIMES DAILY
Qty: 1200 ML | Refills: 0 | Status: SHIPPED | OUTPATIENT
Start: 2020-12-17 | End: 2021-02-24 | Stop reason: SDUPTHER

## 2020-12-17 NOTE — TELEPHONE ENCOUNTER
----- Message from Kory Root sent at 12/16/2020  4:37 PM EST -----  Subject: Refill Request    QUESTIONS  Name of Medication? venlafaxine (EFFEXOR) 100 MG tablet  Patient-reported dosage and instructions? 2 pills twice a day  How many days do you have left? 1  Preferred Pharmacy? [facility]/PHARMACY #[id number]  Pharmacy phone number (if available)? [phone]  Additional Information for Provider? 30 Day Supply 120 pills total Patient   would like to know if she can have a 90 day supply. Please let patient   know   ---------------------------------------------------------------------------  --------------    Name of Medication? Alum Hydroxide-Mag Carbonate (GAVISCON EXTRA STRENGTH)   232-677 MG/10ML SUSP  Patient-reported dosage and instructions? 3 bottles total  How many days do you have left? 1  Preferred Pharmacy? [facility]/PHARMACY #[id number]  Pharmacy phone number (if available)? [phone]  Additional Information for Provider? 30 day 3 bottles total Patient would   like to get 90 day supply please let patient know  ---------------------------------------------------------------------------  --------------    Name of Medication? docusate sodium (COLACE) 100 MG capsule  Patient-reported dosage and instructions? 1 capsule by mouth once a day  How many days do you have left? 1  Preferred Pharmacy? [facility]/PHARMACY #[id number]  Pharmacy phone number (if available)? [phone]  Additional Information for Provider? Patient would like 90 day supply   please let patient know  ---------------------------------------------------------------------------  --------------  CALL BACK INFO  What is the best way for the office to contact you? OK to leave message on   voicemail  Preferred Call Back Phone Number?  [phone]

## 2020-12-17 NOTE — TELEPHONE ENCOUNTER
Medication:   Colace and Effexor already pended in separate encounter    Requested Prescriptions     Pending Prescriptions Disp Refills    Alum Hydroxide-Mag Carbonate (GAVISCON EXTRA STRENGTH) 508-475 MG/10ML SUSP 1200 mL 0     Sig: Take 10 mLs by mouth 4 times daily        Last Filled:  11.10.2020    Patient Phone Number: [phone] (home)     Last appt: 12/10/2020   Next appt: Visit date not found    Last OARRS: No flowsheet data found.

## 2020-12-17 NOTE — TELEPHONE ENCOUNTER
Alum Hydroxide-Mag Carbonate (GAVISCON EXTRA STRENGTH) 508-475 MG/10ML SUSP 1200 mL 0 12/17/2020     Sig - Route:  Take 10 mLs by mouth 4 times daily - Oral    Sent to pharmacy as: Gaviscon Extra Strength 254-237.5 MG/5ML Oral Suspension (Alum Hydroxide-Mag Carbonate)    E-Prescribing Status: Receipt confirmed by pharmacy (12/17/2020  1:01 PM EST)      venlafaxine (EFFEXOR) 100 MG tablet 120 tablet 0 12/17/2020     Sig: TAKE 2 TABLETS BY MOUTH TWICE A DAY    Sent to pharmacy as: Venlafaxine HCl 100 MG Oral Tablet [facility])    E-Prescribing Status: Receipt confirmed by pharmacy (12/17/2020  1:01 PM EST)      [facility] STOOL SOFTENER 100 MG capsule 30 capsule 0 12/17/2020     Sig: TAKE 1 CAPSULE BY MOUTH DAILY AS NEEDED FOR CONSTIPATION    Sent to pharmacy as: [facility] Stool Softener 100 MG Oral Capsule (docusate sodium)    E-Prescribing Status: Receipt confirmed by pharmacy (12/17/2020  1:01 PM EST)      Pharmacy  [facility] [address] [place] [address] [phone] [place] Ramirez [phone]   [address],  [address] [place] [address]   Phone:  [phone]  Fax:  [phone]

## 2021-01-13 DIAGNOSIS — F32.1 CURRENT MODERATE EPISODE OF MAJOR DEPRESSIVE DISORDER WITHOUT PRIOR EPISODE (HCC): ICD-10-CM

## 2021-01-13 RX ORDER — VENLAFAXINE 100 MG/1
TABLET ORAL
Qty: 120 TABLET | Refills: 0 | Status: SHIPPED | OUTPATIENT
Start: 2021-01-13 | End: 2021-02-16

## 2021-01-13 NOTE — TELEPHONE ENCOUNTER
Last Fill 12/17/20  Last Office Visit 12/10/20  Return in about 3 months (around 3/10/2021).   No Pending Appointments

## 2021-02-04 ENCOUNTER — VIRTUAL VISIT (OUTPATIENT)
Dept: FAMILY MEDICINE CLINIC | Age: 36
End: 2021-02-04
Payer: COMMERCIAL

## 2021-02-04 DIAGNOSIS — B96.89 ACUTE BACTERIAL SINUSITIS: Primary | ICD-10-CM

## 2021-02-04 DIAGNOSIS — J01.90 ACUTE BACTERIAL SINUSITIS: Primary | ICD-10-CM

## 2021-02-04 PROCEDURE — G8417 CALC BMI ABV UP PARAM F/U: HCPCS | Performed by: NURSE PRACTITIONER

## 2021-02-04 PROCEDURE — 1036F TOBACCO NON-USER: CPT | Performed by: NURSE PRACTITIONER

## 2021-02-04 PROCEDURE — G8482 FLU IMMUNIZE ORDER/ADMIN: HCPCS | Performed by: NURSE PRACTITIONER

## 2021-02-04 PROCEDURE — 99213 OFFICE O/P EST LOW 20 MIN: CPT | Performed by: NURSE PRACTITIONER

## 2021-02-04 PROCEDURE — G8427 DOCREV CUR MEDS BY ELIG CLIN: HCPCS | Performed by: NURSE PRACTITIONER

## 2021-02-04 RX ORDER — AMOXICILLIN AND CLAVULANATE POTASSIUM 875; 125 MG/1; MG/1
1 TABLET, FILM COATED ORAL 2 TIMES DAILY
Qty: 14 TABLET | Refills: 0 | Status: SHIPPED | OUTPATIENT
Start: 2021-02-04 | End: 2021-02-11

## 2021-02-04 RX ORDER — FLUTICASONE PROPIONATE 50 MCG
1-2 SPRAY, SUSPENSION (ML) NASAL DAILY
Qty: 1 BOTTLE | Refills: 1 | Status: SHIPPED | OUTPATIENT
Start: 2021-02-04 | End: 2022-05-31 | Stop reason: SDUPTHER

## 2021-02-04 SDOH — ECONOMIC STABILITY: FOOD INSECURITY: WITHIN THE PAST 12 MONTHS, YOU WORRIED THAT YOUR FOOD WOULD RUN OUT BEFORE YOU GOT MONEY TO BUY MORE.: NEVER TRUE

## 2021-02-04 SDOH — ECONOMIC STABILITY: INCOME INSECURITY: HOW HARD IS IT FOR YOU TO PAY FOR THE VERY BASICS LIKE FOOD, HOUSING, MEDICAL CARE, AND HEATING?: NOT HARD AT ALL

## 2021-02-04 SDOH — ECONOMIC STABILITY: FOOD INSECURITY: WITHIN THE PAST 12 MONTHS, THE FOOD YOU BOUGHT JUST DIDN'T LAST AND YOU DIDN'T HAVE MONEY TO GET MORE.: NEVER TRUE

## 2021-02-04 SDOH — ECONOMIC STABILITY: TRANSPORTATION INSECURITY
IN THE PAST 12 MONTHS, HAS LACK OF TRANSPORTATION KEPT YOU FROM MEETINGS, WORK, OR FROM GETTING THINGS NEEDED FOR DAILY LIVING?: NOT ASKED

## 2021-02-04 NOTE — PROGRESS NOTES
Agustin Or (:  1985) is a 30 y.o. female,Established patient, here for evaluation of the following chief complaint(s): Letter for School/Work (from covid test)      ASSESSMENT/PLAN:  1. Acute bacterial sinusitis  Stable;  COVID swab negative. Begin Amox/clavu and flonase. Per patient she tolerates. Note written for work. -     amoxicillin-clavulanate (AUGMENTIN) 875-125 MG per tablet; Take 1 tablet by mouth 2 times daily for 7 days, Disp-14 tablet, R-0Normal  -     fluticasone (FLONASE) 50 MCG/ACT nasal spray; 1-2 sprays by Each Nostril route daily, Disp-1 Bottle, R-1Normal      Return if symptoms worsen or fail to improve.     SUBJECTIVE/OBJECTIVE:  HPI   Symptoms started on 2021  Was fatigued, sore throat, not a lot of coughing, body aches, fever  Had rapid test completed- COVID negative on   Still has nasal congestion- has gotten better; green drainage, headache, sinuses are puffy  Today is last day of quarantine- needs a letter to go back to work    Has colonoscopy scheduled for   Then will f/u with Dr. Jenelle Dodson    Review of Systems    Patient-Reported Vitals 2021   Patient-Reported Weight 230 lb   Patient-Reported Height 5' 6\"   Patient-Reported Temperature -        Physical Exam Aneesh Del Real is a 30 y.o. female being evaluated by a Virtual Visit (video visit) encounter to address concerns as mentioned above. A caregiver was present when appropriate. Due to this being a TeleHealth encounter (During ARXVY-18 public health emergency), evaluation of the following organ systems was limited: Vitals/Constitutional/EENT/Resp/CV/GI//MS/Neuro/Skin/Heme-Lymph-Imm. Pursuant to the emergency declaration under the [address] [place] [address] and the [employer] Act, this Virtual Visit was conducted with patient's (and/or legal guardian's) consent, to reduce the patient's risk of exposure to COVID-19 and provide necessary medical care. The patient (and/or legal guardian) has also been advised to contact this office for worsening conditions or problems, and seek emergency medical treatment and/or call 911 if deemed necessary. Patient identification was verified at the start of the visit: Yes    Services were provided through a video synchronous discussion virtually to substitute for in-person clinic visit. Patient was located at home and provider was located in office or at home. An electronic signature was used to authenticate this note.     --Rosetta Askew, APRN - CNP

## 2021-02-04 NOTE — LETTER
[facility]  [address] [place] [address],[address]  Phone: [phone]  Fax: [phone]    Margarett Dakins, APRN - CNP        February 4, 2021     Patient: Payton Camarena   YOB: 1985   Date of Visit: 2/4/2021       To Whom It May Concern: It is my medical opinion that Payton Camarena may return to work on 2/5/2021. If you have any questions or concerns, please don't hesitate to call.     Sincerely,          Margarett Dakins, APRN - CNP

## 2021-02-16 DIAGNOSIS — F32.1 CURRENT MODERATE EPISODE OF MAJOR DEPRESSIVE DISORDER WITHOUT PRIOR EPISODE (HCC): ICD-10-CM

## 2021-02-16 RX ORDER — VENLAFAXINE 100 MG/1
TABLET ORAL
Qty: 120 TABLET | Refills: 0 | Status: SHIPPED | OUTPATIENT
Start: 2021-02-16 | End: 2021-03-01

## 2021-02-24 RX ORDER — ALUMINUM HYDROXIDE AND MAGNESIUM CARBONATE 254; 237.5 MG/5ML; MG/5ML
10 LIQUID ORAL 4 TIMES DAILY
Qty: 1200 ML | Refills: 0 | Status: SHIPPED | OUTPATIENT
Start: 2021-02-24

## 2021-02-24 RX ORDER — CHOLECALCIFEROL (VITAMIN D3) 50 MCG
2000 TABLET ORAL DAILY
Qty: 90 TABLET | Refills: 1 | Status: SHIPPED | OUTPATIENT
Start: 2021-02-24 | End: 2022-05-31 | Stop reason: SDUPTHER

## 2021-02-27 DIAGNOSIS — F32.1 CURRENT MODERATE EPISODE OF MAJOR DEPRESSIVE DISORDER WITHOUT PRIOR EPISODE (HCC): ICD-10-CM

## 2021-03-01 RX ORDER — VENLAFAXINE 100 MG/1
TABLET ORAL
Qty: 120 TABLET | Refills: 0 | Status: SHIPPED | OUTPATIENT
Start: 2021-03-01 | End: 2021-05-16

## 2021-03-01 NOTE — TELEPHONE ENCOUNTER
Last OV 2/4/2021 (acute)  Next OV Visit date not found  Last filled 2/16/2021    Requested Prescriptions     Pending Prescriptions Disp Refills    venlafaxine (EFFEXOR) 100 MG tablet [Pharmacy Med Name: VENLAFAXINE  MG TABLET] 120 tablet 0     Sig: TAKE 2 TABLETS BY MOUTH TWICE A DAY

## 2021-03-03 ENCOUNTER — OFFICE VISIT (OUTPATIENT)
Dept: FAMILY MEDICINE CLINIC | Age: 36
End: 2021-03-03
Payer: COMMERCIAL

## 2021-03-03 VITALS
TEMPERATURE: 97.2 F | BODY MASS INDEX: 42.27 KG/M2 | HEIGHT: 66 IN | DIASTOLIC BLOOD PRESSURE: 76 MMHG | WEIGHT: 263 LBS | SYSTOLIC BLOOD PRESSURE: 138 MMHG | HEART RATE: 129 BPM | OXYGEN SATURATION: 96 %

## 2021-03-03 DIAGNOSIS — R10.31 RLQ ABDOMINAL PAIN: Primary | ICD-10-CM

## 2021-03-03 PROCEDURE — G8482 FLU IMMUNIZE ORDER/ADMIN: HCPCS | Performed by: FAMILY MEDICINE

## 2021-03-03 PROCEDURE — 1036F TOBACCO NON-USER: CPT | Performed by: FAMILY MEDICINE

## 2021-03-03 PROCEDURE — G8427 DOCREV CUR MEDS BY ELIG CLIN: HCPCS | Performed by: FAMILY MEDICINE

## 2021-03-03 PROCEDURE — G8417 CALC BMI ABV UP PARAM F/U: HCPCS | Performed by: FAMILY MEDICINE

## 2021-03-03 PROCEDURE — 99213 OFFICE O/P EST LOW 20 MIN: CPT | Performed by: FAMILY MEDICINE

## 2021-03-03 ASSESSMENT — ENCOUNTER SYMPTOMS
VOMITING: 0
CONSTIPATION: 1
CHEST TIGHTNESS: 0
RHINORRHEA: 0
NAUSEA: 1
DIARRHEA: 1
BLOOD IN STOOL: 0
SORE THROAT: 0
SHORTNESS OF BREATH: 0
ABDOMINAL PAIN: 1

## 2021-03-03 NOTE — PROGRESS NOTES
Subjective:   Patient ID: Reba Rivero is a 30 y.o. female. HPI by clinical support staff:   Chief Complaint   Patient presents with    Check-Up     stabbing pain in abdomen on right, nausea diarrhea and sweats start x 1 week       Preliminary data above this line collected by clinical support staff.    ______________________________________________________________________     HPI by Provider:   HPI   Patient presents today with complaint of stabbing pain in right lower quadrant x 1 week- reports a history of IBS-C and takes stool softeners every other day. Reports pain is intense and cant eat or move due to pain- she has been sweating but has no fever or chills. Has nausea but no vomiting. Had 3 loose stools today no blood or mucus. Has to force food down. LMP 2 weeks ago. Data above this line collected by Provider. Patient's medications, allergies, past medical, surgical, social and family histories were reviewed and updated as appropriate.     Patient Care Team:  VY Smith CNP as PCP - General (Certified Nurse Practitioner)  VY Smith CNP as PCP - [facility] Empaneled Provider    Current Outpatient Medications on File Prior to Visit   Medication Sig Dispense Refill    venlafaxine (EFFEXOR) 100 MG tablet TAKE 2 TABLETS BY MOUTH TWICE A  tablet 0    vitamin D (CHOLECALCIFEROL) 50 MCG (2000 UT) TABS tablet Take 1 tablet by mouth daily 90 tablet 1    Alum Hydroxide-Mag Carbonate (GAVISCON EXTRA STRENGTH) 508-475 MG/10ML SUSP Take 10 mLs by mouth 4 times daily 1200 mL 0    fluticasone (FLONASE) 50 MCG/ACT nasal spray 1-2 sprays by Each Nostril route daily 1 Bottle 1    [facility] STOOL SOFTENER 100 MG capsule TAKE 1 CAPSULE BY MOUTH DAILY AS NEEDED FOR CONSTIPATION 30 capsule 0    albuterol sulfate  (90 Base) MCG/ACT inhaler Inhale 2 puffs into the lungs every 6 hours as needed for Wheezing 1 Inhaler 2    traZODone (DESYREL) 50 MG tablet Take 2 tablets by mouth nightly as needed for Sleep 60 tablet 5    colestipol (COLESTID) 1 g tablet Take 1 g by mouth 2 times daily      dicyclomine (BENTYL) 10 MG capsule Take 10 mg by mouth 4 times daily (before meals and nightly)      buprenorphine-naloxone (SUBOXONE) 8-2 MG SUBL SL tablet Place 1 tablet under the tongue daily.  ibuprofen (IBU) 800 MG tablet Take 1 tablet by mouth every 8 hours as needed for Pain. 20 tablet 0     No current facility-administered medications on file prior to visit. Review of Systems   Constitutional: Negative for activity change, appetite change, chills, fatigue and fever. HENT: Negative for congestion, rhinorrhea and sore throat. Respiratory: Negative for chest tightness and shortness of breath. Cardiovascular: Negative for chest pain, palpitations and leg swelling. Gastrointestinal: Positive for abdominal pain, constipation, diarrhea and nausea. Negative for blood in stool and vomiting. Genitourinary: Negative for dysuria and frequency. Musculoskeletal: Negative for arthralgias. Neurological: Negative for dizziness, weakness and headaches. Psychiatric/Behavioral: Negative for hallucinations. All other systems reviewed and are negative. ROS above this line reviewed by Provider. Objective:   /76 (Site: Left Upper Arm, Position: Sitting, Cuff Size: Large Adult)   Pulse 129   Temp 97.2 °F (36.2 °C)   Ht 5' 6\" (1.676 m)   Wt 263 lb (119.3 kg)   SpO2 96%   BMI 42.45 kg/m²   Physical Exam  Vitals signs and nursing note reviewed. Constitutional:       General: She is not in acute distress. Appearance: Normal appearance. She is not ill-appearing, toxic-appearing or diaphoretic. HENT:      Head: Normocephalic and atraumatic. Mouth/Throat:      Mouth: Mucous membranes are moist.      Pharynx: Oropharynx is clear. No oropharyngeal exudate or posterior oropharyngeal erythema. Eyes:      General: No scleral icterus.      Conjunctiva/sclera: Conjunctivae normal.   Neck:      Musculoskeletal: Normal range of motion. Cardiovascular:      Rate and Rhythm: Normal rate and regular rhythm. Heart sounds: Normal heart sounds. No murmur. No friction rub. No gallop. Pulmonary:      Effort: Pulmonary effort is normal.   Abdominal:      General: Abdomen is flat. Bowel sounds are normal. There is no distension. Palpations: Abdomen is soft. Tenderness: There is abdominal tenderness (RLQ). There is no guarding. Skin:     General: Skin is warm and dry. Capillary Refill: Capillary refill takes less than 2 seconds. Neurological:      Mental Status: She is alert. Psychiatric:         Mood and Affect: Mood normal.         Behavior: Behavior normal.       Assessment and Plan:   1. RLQ abdominal pain  Acute abdomen on exam she was moaning and groining throughout the encounter. Advised to go to ED. When I told her to go to ED she requested a letter showing she was here as proof for her  due to a missed appointment. Patient stood up from bed without her initial groining and walked  Comfortably out of the exam room. This chart note was prepared using a voice recognition dictation program. This note was reviewed for accuracy; however, addition, deletion and sound-alike word errors may occur. If there are any questions regarding this chart note, please contact the originating provider. Electronically signed by   Sagar Rice MD  3/3/2021   2:31 PM    Return if symptoms worsen or fail to improve.

## 2021-03-08 RX ORDER — TRAZODONE HYDROCHLORIDE 50 MG/1
100 TABLET ORAL NIGHTLY PRN
Qty: 60 TABLET | Refills: 0 | Status: SHIPPED | OUTPATIENT
Start: 2021-03-08 | End: 2021-03-18

## 2021-03-08 RX ORDER — DOCUSATE SODIUM 100 MG
CAPSULE ORAL
Qty: 30 CAPSULE | Refills: 0 | Status: SHIPPED | OUTPATIENT
Start: 2021-03-08

## 2021-03-18 ENCOUNTER — OFFICE VISIT (OUTPATIENT)
Dept: FAMILY MEDICINE CLINIC | Age: 36
End: 2021-03-18
Payer: COMMERCIAL

## 2021-03-18 VITALS
OXYGEN SATURATION: 97 % | HEART RATE: 97 BPM | TEMPERATURE: 97.2 F | WEIGHT: 266.4 LBS | DIASTOLIC BLOOD PRESSURE: 64 MMHG | BODY MASS INDEX: 42.81 KG/M2 | HEIGHT: 66 IN | SYSTOLIC BLOOD PRESSURE: 124 MMHG

## 2021-03-18 DIAGNOSIS — E04.9 ENLARGED THYROID: ICD-10-CM

## 2021-03-18 DIAGNOSIS — Z13.0 SCREENING FOR IRON DEFICIENCY ANEMIA: ICD-10-CM

## 2021-03-18 DIAGNOSIS — Z13.220 SCREENING FOR LIPID DISORDERS: ICD-10-CM

## 2021-03-18 DIAGNOSIS — G47.9 DIFFICULTY SLEEPING: ICD-10-CM

## 2021-03-18 DIAGNOSIS — Z83.49 FAMILY HISTORY OF THYROID DISEASE: ICD-10-CM

## 2021-03-18 DIAGNOSIS — N81.6 RECTOCELE: ICD-10-CM

## 2021-03-18 DIAGNOSIS — F32.1 CURRENT MODERATE EPISODE OF MAJOR DEPRESSIVE DISORDER WITHOUT PRIOR EPISODE (HCC): Primary | ICD-10-CM

## 2021-03-18 PROCEDURE — 99214 OFFICE O/P EST MOD 30 MIN: CPT | Performed by: NURSE PRACTITIONER

## 2021-03-18 PROCEDURE — G8427 DOCREV CUR MEDS BY ELIG CLIN: HCPCS | Performed by: NURSE PRACTITIONER

## 2021-03-18 PROCEDURE — G8417 CALC BMI ABV UP PARAM F/U: HCPCS | Performed by: NURSE PRACTITIONER

## 2021-03-18 PROCEDURE — 1036F TOBACCO NON-USER: CPT | Performed by: NURSE PRACTITIONER

## 2021-03-18 PROCEDURE — G8482 FLU IMMUNIZE ORDER/ADMIN: HCPCS | Performed by: NURSE PRACTITIONER

## 2021-03-18 RX ORDER — DICYCLOMINE HYDROCHLORIDE 10 MG/1
10 CAPSULE ORAL
Qty: 120 CAPSULE | Refills: 0 | Status: SHIPPED | OUTPATIENT
Start: 2021-03-18

## 2021-03-18 SDOH — HEALTH STABILITY: MENTAL HEALTH: HOW MANY STANDARD DRINKS CONTAINING ALCOHOL DO YOU HAVE ON A TYPICAL DAY?: NOT ASKED

## 2021-03-18 SDOH — ECONOMIC STABILITY: TRANSPORTATION INSECURITY
IN THE PAST 12 MONTHS, HAS LACK OF TRANSPORTATION KEPT YOU FROM MEETINGS, WORK, OR FROM GETTING THINGS NEEDED FOR DAILY LIVING?: NO

## 2021-03-18 SDOH — ECONOMIC STABILITY: TRANSPORTATION INSECURITY
IN THE PAST 12 MONTHS, HAS THE LACK OF TRANSPORTATION KEPT YOU FROM MEDICAL APPOINTMENTS OR FROM GETTING MEDICATIONS?: NO

## 2021-03-18 NOTE — PROGRESS NOTES
Carey Alvarado (:  1985) is a 30 y.o. female,Established patient, here for evaluation of the following chief complaint(s):  Follow-up      ASSESSMENT/PLAN:  1. Current moderate episode of major depressive disorder without prior episode ([address]  Stable;  Continue current regimen. 2. Difficulty sleeping  Stable;  Discussed starting doxepin. Will get liver labs first.  Consider sleep study  3. Rectocele  Stable;  Patient would like second opinion from urogynecology. -     External Referral To Urology  4. Enlarged thyroid  Stable; Thyroid US and TSH ordered. -     US THYROID; Future  5. Family history of thyroid disease  Stable;  TSH ordered. -     TSH without Reflex; Future  6. Screening for lipid disorders  Stable;  Labs ordered. -     Comprehensive Metabolic Panel; Future  -     Lipid Panel; Future  7. Screening for iron deficiency anemia  Stable;  Labs ordered. -     CBC Auto Differential; Future      Return in about 3 months (around 2021). SUBJECTIVE/OBJECTIVE:  HPI  Depression  Has not gained weight on Effexor  Continues to have sweating  Is really helpful  Wants to continue on it    Difficulty sleeping  Wakes up 3-4x/night  Does not snore  Has a lot of daytime sleepiness  Is making her not as productive  Is just waking up- mind is not keeping her up  Is sleep eating- find chocolate on her pillow  Worse with trazodone  Declines remeron; will wait on doxepin to look at liver labs  Melatonin did not work  Consider sleep study    Colonoscopy scheduled for 3/25- Dr. Sami Raines    Review of Systems    Physical Exam  Vitals signs reviewed. Constitutional:       Appearance: Normal appearance. HENT:      Head: Normocephalic. Neck:      Thyroid: Thyromegaly present. Cardiovascular:      Rate and Rhythm: Normal rate and regular rhythm. Pulses: Normal pulses.       Heart sounds: Normal heart sounds, S1 normal and S2 normal.   Pulmonary:      Effort: Pulmonary effort is normal.      Breath sounds: Normal breath sounds and air entry. Musculoskeletal:      Right lower leg: No edema. Left lower leg: No edema. Neurological:      Mental Status: She is alert. Psychiatric:         Mood and Affect: Mood normal.         An electronic signature was used to authenticate this note.     --VY Muñoz - CNP

## 2021-03-22 DIAGNOSIS — Z13.0 SCREENING FOR IRON DEFICIENCY ANEMIA: ICD-10-CM

## 2021-03-22 DIAGNOSIS — Z83.49 FAMILY HISTORY OF THYROID DISEASE: ICD-10-CM

## 2021-03-22 DIAGNOSIS — Z13.220 SCREENING FOR LIPID DISORDERS: ICD-10-CM

## 2021-03-23 LAB
A/G RATIO: 1.4 (ref 1.1–2.2)
ALBUMIN SERPL-MCNC: 3.9 G/DL (ref 3.4–5)
ALP BLD-CCNC: 137 U/L (ref 40–129)
ALT SERPL-CCNC: 28 U/L (ref 10–40)
ANION GAP SERPL CALCULATED.3IONS-SCNC: 10 MMOL/L (ref 3–16)
AST SERPL-CCNC: 37 U/L (ref 15–37)
BASOPHILS ABSOLUTE: 0.1 K/UL (ref 0–0.2)
BASOPHILS RELATIVE PERCENT: 1.3 %
BILIRUB SERPL-MCNC: <0.2 MG/DL (ref 0–1)
BUN BLDV-MCNC: 10 MG/DL (ref 7–20)
CALCIUM SERPL-MCNC: 8.7 MG/DL (ref 8.3–10.6)
CHLORIDE BLD-SCNC: 104 MMOL/L (ref 99–110)
CHOLESTEROL, TOTAL: 182 MG/DL (ref 0–199)
CO2: 27 MMOL/L (ref 21–32)
CREAT SERPL-MCNC: 0.6 MG/DL (ref 0.6–1.1)
EOSINOPHILS ABSOLUTE: 0.3 K/UL (ref 0–0.6)
EOSINOPHILS RELATIVE PERCENT: 5.5 %
GFR AFRICAN AMERICAN: >60
GFR NON-AFRICAN AMERICAN: >60
GLOBULIN: 2.7 G/DL
GLUCOSE BLD-MCNC: 99 MG/DL (ref 70–99)
HCT VFR BLD CALC: 37.1 % (ref 36–48)
HDLC SERPL-MCNC: 46 MG/DL (ref 40–60)
HEMOGLOBIN: 11.5 G/DL (ref 12–16)
LDL CHOLESTEROL CALCULATED: 101 MG/DL
LYMPHOCYTES ABSOLUTE: 1.5 K/UL (ref 1–5.1)
LYMPHOCYTES RELATIVE PERCENT: 25.7 %
MCH RBC QN AUTO: 24.1 PG (ref 26–34)
MCHC RBC AUTO-ENTMCNC: 30.9 G/DL (ref 31–36)
MCV RBC AUTO: 77.8 FL (ref 80–100)
MONOCYTES ABSOLUTE: 0.4 K/UL (ref 0–1.3)
MONOCYTES RELATIVE PERCENT: 7.2 %
NEUTROPHILS ABSOLUTE: 3.4 K/UL (ref 1.7–7.7)
NEUTROPHILS RELATIVE PERCENT: 60.3 %
PDW BLD-RTO: 17.8 % (ref 12.4–15.4)
PLATELET # BLD: 259 K/UL (ref 135–450)
PMV BLD AUTO: 10 FL (ref 5–10.5)
POTASSIUM SERPL-SCNC: 4.1 MMOL/L (ref 3.5–5.1)
RBC # BLD: 4.77 M/UL (ref 4–5.2)
SODIUM BLD-SCNC: 141 MMOL/L (ref 136–145)
TOTAL PROTEIN: 6.6 G/DL (ref 6.4–8.2)
TRIGL SERPL-MCNC: 177 MG/DL (ref 0–150)
TSH SERPL DL<=0.05 MIU/L-ACNC: 0.91 UIU/ML (ref 0.27–4.2)
VLDLC SERPL CALC-MCNC: 35 MG/DL
WBC # BLD: 5.7 K/UL (ref 4–11)

## 2021-04-14 RX ORDER — LANOLIN ALCOHOL/MO/W.PET/CERES
325 CREAM (GRAM) TOPICAL 2 TIMES DAILY
Qty: 60 TABLET | Refills: 2 | Status: SHIPPED | OUTPATIENT
Start: 2021-04-14 | End: 2022-06-05

## 2021-04-26 ENCOUNTER — VIRTUAL VISIT (OUTPATIENT)
Dept: FAMILY MEDICINE CLINIC | Age: 36
End: 2021-04-26
Payer: COMMERCIAL

## 2021-04-26 DIAGNOSIS — Z86.19 HISTORY OF HELICOBACTER PYLORI INFECTION: ICD-10-CM

## 2021-04-26 DIAGNOSIS — B34.9 VIRAL ILLNESS: Primary | ICD-10-CM

## 2021-04-26 PROCEDURE — 1036F TOBACCO NON-USER: CPT | Performed by: NURSE PRACTITIONER

## 2021-04-26 PROCEDURE — G8417 CALC BMI ABV UP PARAM F/U: HCPCS | Performed by: NURSE PRACTITIONER

## 2021-04-26 PROCEDURE — G8427 DOCREV CUR MEDS BY ELIG CLIN: HCPCS | Performed by: NURSE PRACTITIONER

## 2021-04-26 PROCEDURE — 99213 OFFICE O/P EST LOW 20 MIN: CPT | Performed by: NURSE PRACTITIONER

## 2021-04-26 RX ORDER — ONDANSETRON 4 MG/1
4-8 TABLET, FILM COATED ORAL 3 TIMES DAILY PRN
Qty: 20 TABLET | Refills: 0 | Status: SHIPPED | OUTPATIENT
Start: 2021-04-26 | End: 2022-06-05

## 2021-04-26 NOTE — LETTER
[address] [facility],[address]  Phone: [phone]  Fax: [phone]    VY Pang CNP        April 26, 2021     Patient: Dmitriy Walsh   YOB: 1985   Date of Visit: 4/26/2021       To Whom It May Concern: It is my medical opinion that Dmitriy Walsh may return to work on 5/2/2021. If you have any questions or concerns, please don't hesitate to call.     Sincerely,          VY Pang CNP

## 2021-04-26 NOTE — PROGRESS NOTES
Appropriations Act. Patient identification was verified, and a caregiver was present when appropriate. The patient was located in a state where the provider was credentialed to provide care. An electronic signature was used to authenticate this note.     --VY Malcolm - CNP

## 2021-05-03 ENCOUNTER — TELEPHONE (OUTPATIENT)
Dept: FAMILY MEDICINE CLINIC | Age: 36
End: 2021-05-03

## 2021-05-03 ENCOUNTER — HOSPITAL ENCOUNTER (OUTPATIENT)
Dept: ULTRASOUND IMAGING | Age: 36
Discharge: HOME | End: 2021-05-03
Payer: COMMERCIAL

## 2021-05-03 DIAGNOSIS — E04.9 ENLARGED THYROID: ICD-10-CM

## 2021-05-03 PROCEDURE — 76536 US EXAM OF HEAD AND NECK: CPT

## 2021-05-04 PROBLEM — K58.1 IRRITABLE BOWEL SYNDROME WITH CONSTIPATION: Status: ACTIVE | Noted: 2021-05-03

## 2021-05-04 PROBLEM — A04.8 POSITIVE H. PYLORI TEST: Status: ACTIVE | Noted: 2019-01-01

## 2021-05-04 PROBLEM — N81.6 RECTOCELE: Status: ACTIVE | Noted: 2021-02-01

## 2021-05-14 DIAGNOSIS — F32.1 CURRENT MODERATE EPISODE OF MAJOR DEPRESSIVE DISORDER WITHOUT PRIOR EPISODE (HCC): ICD-10-CM

## 2021-05-16 RX ORDER — VENLAFAXINE 100 MG/1
TABLET ORAL
Qty: 120 TABLET | Refills: 0 | Status: SHIPPED | OUTPATIENT
Start: 2021-05-16 | End: 2021-06-07

## 2021-05-17 DIAGNOSIS — Z86.19 HISTORY OF HELICOBACTER PYLORI INFECTION: ICD-10-CM

## 2021-05-17 NOTE — TELEPHONE ENCOUNTER
Name: Esteban Martínez MRN: [id number]   Date: 5/20/2021 Status: Carol   Time: 3:00 PM Length: 20   Visit Type: PHYSICAL [1005] Copay: $0.00   Provider: VY Mendez CNP Department: [facility]   Referral #:   Referral Status:     Referring Provider:   Patient Type:     Notes: Physical, pap smear    Disposition Notes:     Made On: 5/17/2021 1:48 PM By: Gi Ramirez

## 2021-05-19 LAB — H PYLORI BREATH TEST: NEGATIVE

## 2021-05-27 ENCOUNTER — OFFICE VISIT (OUTPATIENT)
Dept: FAMILY MEDICINE CLINIC | Age: 36
End: 2021-05-27
Payer: COMMERCIAL

## 2021-05-27 VITALS
HEIGHT: 66 IN | HEART RATE: 116 BPM | OXYGEN SATURATION: 97 % | BODY MASS INDEX: 45 KG/M2 | WEIGHT: 280 LBS | DIASTOLIC BLOOD PRESSURE: 84 MMHG | SYSTOLIC BLOOD PRESSURE: 124 MMHG

## 2021-05-27 DIAGNOSIS — Z00.00 WELL ADULT EXAM: Primary | ICD-10-CM

## 2021-05-27 DIAGNOSIS — Z11.3 SCREENING FOR STD (SEXUALLY TRANSMITTED DISEASE): ICD-10-CM

## 2021-05-27 DIAGNOSIS — Z12.4 SCREENING FOR CERVICAL CANCER: ICD-10-CM

## 2021-05-27 DIAGNOSIS — R63.5 WEIGHT GAIN: ICD-10-CM

## 2021-05-27 DIAGNOSIS — N81.6 RECTOCELE: ICD-10-CM

## 2021-05-27 DIAGNOSIS — F32.1 CURRENT MODERATE EPISODE OF MAJOR DEPRESSIVE DISORDER WITHOUT PRIOR EPISODE (HCC): ICD-10-CM

## 2021-05-27 PROCEDURE — 99395 PREV VISIT EST AGE 18-39: CPT | Performed by: NURSE PRACTITIONER

## 2021-05-27 ASSESSMENT — PATIENT HEALTH QUESTIONNAIRE - PHQ9
SUM OF ALL RESPONSES TO PHQ QUESTIONS 1-9: 0
2. FEELING DOWN, DEPRESSED OR HOPELESS: 0
1. LITTLE INTEREST OR PLEASURE IN DOING THINGS: 0
SUM OF ALL RESPONSES TO PHQ9 QUESTIONS 1 & 2: 0

## 2021-05-27 NOTE — PROGRESS NOTES
2021    Paula El (:  1985) is a 29 y.o. female, here for a preventive medicine evaluation. Patient Active Problem List   Diagnosis    Depression    H/O drug abuse ([facility] [address]    Acute hepatitis C virus infection without hepatic coma    Social anxiety disorder    Rectocele    Positive H. pylori test    Irritable bowel syndrome with constipation    History of post traumatic stress disorder    History of manic depressive disorder     Depression  Seeing counselor at [facility]- is going great; just started it  Taking Effexor- suppresses her appetite    Weight gain  X 4 mos- on diabetic diet  Does not drink pop anymore  Diet: fresh fruit, 1 piece of toast; croissant sandwich cheese and deli meat; dinner- grilled chicken and vegetable  Exercise: works 2 jobs; used to walk at night    Thyroid nodule   TSH is ok  + FH cancer  Due for repeat in 1 year    IBS  No constipation x a while  Rectocele has not gotten larger  Is doing kegels  Wants to get into PT    Itch back of throat  Is using flonase  Took liquid allergy (Allegra) helps    Hx of drug abuse  Goes to [facility] [place]  Denies cravings  Is on [employer]- does counseling    Denies alcohol use  Vapes 0 mg of nicotine  Denies drug use    Last pap x a couple years- abnormal pap 8-9 years ago  LMP: 2 weeks ago- regular    Review of Systems    Prior to Visit Medications    Medication Sig Taking?  Authorizing Provider   venlafaxine (EFFEXOR) 100 MG tablet TAKE 2 TABLETS BY MOUTH TWICE A DAY Yes VY Taylor CNP   ondansetron (ZOFRAN) 4 MG tablet Take 1-2 tablets by mouth 3 times daily as needed for Nausea or Vomiting Yes VY Taylor CNP   ferrous sulfate (FE TABS) 325 (65 Fe) MG EC tablet Take 1 tablet by mouth 2 times daily Yes VY Taylor CNP   dicyclomine (BENTYL) 10 MG capsule Take 1 capsule by mouth 4 times daily (before meals and nightly) Yes Charles Simon Sonia APRN - CNP   [employer]S STOOL SOFTENER 100 MG capsule TAKE 1 CAPSULE BY MOUTH DAILY AS NEEDED FOR CONSTIPATION Yes Chicho Starch, APRN - CNP   vitamin D (CHOLECALCIFEROL) 50 MCG (2000 UT) TABS tablet Take 1 tablet by mouth daily Yes Trenton Starch, APRN - CNP   Alum Hydroxide-Mag Carbonate (GAVISCON EXTRA STRENGTH) 683-225 MG/10ML SUSP Take 10 mLs by mouth 4 times daily Yes Trenton Starch, APRN - CNP   fluticasone (FLONASE) 50 MCG/ACT nasal spray 1-2 sprays by Each Nostril route daily Yes Chicho Starch, APRN - CNP   albuterol sulfate  (90 Base) MCG/ACT inhaler Inhale 2 puffs into the lungs every 6 hours as needed for Wheezing Yes Alexandro Alfredo MD   colestipol (COLESTID) 1 g tablet Take 1 g by mouth 2 times daily Yes Historical Provider, MD   buprenorphine-naloxone (SUBOXONE) 8-2 MG SUBL SL tablet Place 1 tablet under the tongue daily. Yes Historical Provider, MD   ibuprofen (IBU) 800 MG tablet Take 1 tablet by mouth every 8 hours as needed for Pain. Yes Tommy Garcia MD   metroNIDAZOLE (FLAGYL) 500 MG tablet Take 1 tablet by mouth 2 times daily for 7 days  Trenton Starch, APRN - CNP        Allergies   Allergen Reactions    Acetaminophen      Hep C    Hydrocodone-Acetaminophen Itching    Prednisone Other (See Comments)     Per patient- all over body pain.         Past Medical History:   Diagnosis Date    Asthma     Hepatitis C     Heroin abuse ([facility] [address]     IBS (irritable bowel syndrome)     PID        Past Surgical History:   Procedure Laterality Date    CHOLECYSTECTOMY      GALLBLADDER SURGERY      TUBAL LIGATION         Social History     Socioeconomic History    Marital status: Single     Spouse name: Not on file    Number of children: Not on file    Years of education: Not on file    Highest education level: Not on file   Occupational History    Not on file   Tobacco Use    Smoking status: Former Smoker     Packs/day: 0.50     Types: Cigarettes Quit date: 2018     Years since quittin.8    Smokeless tobacco: Never Used   Vaping Use    Vaping Use: Every day    Substances: Always   Substance and Sexual Activity    Alcohol use: Not Currently    Drug use: No    Sexual activity: Yes     Partners: Male   Other Topics Concern    Not on file   Social History Narrative    Not on file     Social Determinants of Health     Financial Resource Strain: Low Risk     Difficulty of Paying Living Expenses: Not hard at all   Food Insecurity: No Food Insecurity    Worried About Running Out of Food in the Last Year: Never true    Fabiola of Food in the Last Year: Never true   Transportation Needs: No Transportation Needs    Lack of Transportation (Medical): No    Lack of Transportation (Non-Medical): No   Physical Activity:     Days of Exercise per Week:     Minutes of Exercise per Session:    Stress:     Feeling of Stress :    Social Connections:     Frequency of Communication with Friends and Family:     Frequency of Social Gatherings with Friends and Family:     Attends Anabaptist Services:     Active Member of Clubs or Organizations:     Attends Club or Organization Meetings:     Marital Status:    Intimate Partner Violence:     Fear of Current or Ex-Partner:     Emotionally Abused:     Physically Abused:     Sexually Abused:         Family History   Problem Relation Age of Onset    Hypothyroidism Mother     Diabetes Father     Heart Disease Father     High Blood Pressure Father     High Cholesterol Father        ADVANCE DIRECTIVE: N, <no information>    Vitals:    21 1507   BP: 124/84   Site: Left Upper Arm   Position: Sitting   Cuff Size: Large Adult   Pulse: 116   SpO2: 97%   Weight: 280 lb (127 kg)   Height: 5' 6\" (1.676 m)     Estimated body mass index is 45.19 kg/m² as calculated from the following:    Height as of this encounter: 5' 6\" (1.676 m). Weight as of this encounter: 280 lb (127 kg).     Physical Exam  Vitals reviewed. Constitutional:       Appearance: Normal appearance. HENT:      Head: Normocephalic. Right Ear: Tympanic membrane, ear canal and external ear normal.      Left Ear: Tympanic membrane, ear canal and external ear normal.      Nose: Nose normal.      Mouth/Throat:      Lips: Pink. Mouth: Mucous membranes are moist.      Pharynx: Oropharynx is clear. Uvula midline. Cardiovascular:      Rate and Rhythm: Normal rate and regular rhythm. Pulses: Normal pulses. Heart sounds: Normal heart sounds, S1 normal and S2 normal.   Pulmonary:      Effort: Pulmonary effort is normal.      Breath sounds: Normal breath sounds and air entry. Abdominal:      Palpations: Abdomen is soft. Tenderness: There is no abdominal tenderness. Genitourinary:     Labia:         Right: No tenderness or lesion. Left: No tenderness or lesion. Vagina: Normal.      Cervix: Normal.      Uterus: Normal.       Adnexa:         Right: No tenderness. Left: No tenderness. Comments: Rectocele present  Musculoskeletal:      Right lower leg: No edema. Left lower leg: No edema. Neurological:      Mental Status: She is alert. Psychiatric:         Mood and Affect: Mood normal.         No flowsheet data found. Lab Results   Component Value Date    CHOL 182 03/22/2021    CHOL 192 01/28/2020    TRIG 177 03/22/2021    TRIG 105 01/28/2020    HDL 46 03/22/2021    HDL 54 01/28/2020    LDLCALC 101 03/22/2021    LDLCALC 117 01/28/2020    GLUCOSE 99 03/22/2021       The ASCVD Risk score (Paige Berger et al., 2013) failed to calculate for the following reasons:     The 2013 ASCVD risk score is only valid for ages 36 to 78    Immunization History   Administered Date(s) Administered    COVID-19, Pfizer, PF, 30mcg/0.3mL 04/06/2021, 05/17/2021    DTP 1985, 1985, 1985, 05/10/1991    Hepatitis A Adult (Havrix, Vaqta) 08/02/2018    Hepatitis B 09/21/1999, 10/21/1999, 08/15/2000    Influenza Vaccine, unspecified formulation 10/31/2014    Influenza Virus Vaccine 10/28/1998, 11/08/2003, 10/11/2004, 10/17/2005, 10/31/2014, 09/23/2015, 10/25/2018    Influenza, Yvrose Retort, IM, PF (6 mo and older Fluzone, Flulaval, Fluarix, and 3 yrs and older Afluria) 10/23/2020    MMR 11/13/1987, 09/21/1999    Polio IPV (IPOL) 1986, 1986, 1986, 05/10/1991    Td, unspecified formulation 03/12/2003    Tdap (Boostrix, Adacel) 05/05/2014    Varicella (Varivax) 05/12/1998       Health Maintenance   Topic Date Due    Varicella vaccine (2 of 2 - 2-dose childhood series) 08/04/1998    Cervical cancer screen  Never done    DTaP/Tdap/Td vaccine (6 - Td) 05/05/2024    Hepatitis B vaccine  Completed    Flu vaccine  Completed    COVID-19 Vaccine  Completed    HIV screen  Completed    Hepatitis A vaccine  Aged Out    Hib vaccine  Aged Out    Meningococcal (ACWY) vaccine  Aged Out    Pneumococcal 0-64 years Vaccine  Aged Out          ASSESSMENT/PLAN:  1. Well adult exam  Stable;  30 yo wellness exam.  Discussed healthy eating and encouraged exercise. Labs reviewed from March. 2. Screening for cervical cancer  Stable;  Pap completed. Discussed results will be back in 1 week. -     PAP SMEAR  3. Rectocele  Stable;  Patient would like to see PT. Encouraged patient to schedule. 4. Current moderate episode of major depressive disorder without prior episode ([address]  Stable;  Continue current regimen. 5. Weight gain  Stable;  Discussed dietary changes and increasing activity. 6. Screening for STD (sexually transmitted disease)  Stable;  Swabs completed. -     Vaginal Pathogens Probes *A  -     C.trachomatis N.gonorrhoeae DNA      Return in about 3 months (around 8/27/2021). An electronic signature was used to authenticate this note.     --VY Malcolm - CNP on 5/30/2021 at 10:12 PM

## 2021-05-28 DIAGNOSIS — N76.0 BACTERIAL VAGINOSIS: Primary | ICD-10-CM

## 2021-05-28 DIAGNOSIS — B96.89 BACTERIAL VAGINOSIS: Primary | ICD-10-CM

## 2021-05-28 LAB
C TRACH DNA GENITAL QL NAA+PROBE: NEGATIVE
CANDIDA SPECIES, DNA PROBE: ABNORMAL
GARDNERELLA VAGINALIS, DNA PROBE: ABNORMAL
N. GONORRHOEAE DNA: NEGATIVE
TRICHOMONAS VAGINALIS DNA: ABNORMAL

## 2021-05-28 RX ORDER — METRONIDAZOLE 500 MG/1
500 TABLET ORAL 2 TIMES DAILY
Qty: 14 TABLET | Refills: 0 | Status: SHIPPED | OUTPATIENT
Start: 2021-05-28 | End: 2021-06-04

## 2021-06-04 RX ORDER — CETIRIZINE HYDROCHLORIDE 10 MG/1
10 TABLET ORAL DAILY
Qty: 30 TABLET | Refills: 2 | Status: SHIPPED | OUTPATIENT
Start: 2021-06-04 | End: 2021-07-04

## 2021-06-05 DIAGNOSIS — F32.1 CURRENT MODERATE EPISODE OF MAJOR DEPRESSIVE DISORDER WITHOUT PRIOR EPISODE (HCC): ICD-10-CM

## 2021-06-07 RX ORDER — VENLAFAXINE 100 MG/1
TABLET ORAL
Qty: 120 TABLET | Refills: 0 | Status: SHIPPED | OUTPATIENT
Start: 2021-06-07 | End: 2022-05-31

## 2021-06-07 NOTE — TELEPHONE ENCOUNTER
Last OV 5/27/2021   Next OV Visit date not found    Requested Prescriptions     Pending Prescriptions Disp Refills    venlafaxine (EFFEXOR) 100 MG tablet [Pharmacy Med Name: VENLAFAXINE  MG TABLET] 120 tablet 0     Sig: TAKE 2 TABLETS BY MOUTH TWICE A DAY

## 2021-07-09 ENCOUNTER — OFFICE VISIT (OUTPATIENT)
Dept: FAMILY MEDICINE CLINIC | Age: 36
End: 2021-07-09
Payer: COMMERCIAL

## 2021-07-09 VITALS
DIASTOLIC BLOOD PRESSURE: 84 MMHG | OXYGEN SATURATION: 98 % | HEART RATE: 98 BPM | SYSTOLIC BLOOD PRESSURE: 132 MMHG | HEIGHT: 66 IN | WEIGHT: 283 LBS | BODY MASS INDEX: 45.48 KG/M2

## 2021-07-09 DIAGNOSIS — R19.8 UMBILICUS DISCHARGE: Primary | ICD-10-CM

## 2021-07-09 DIAGNOSIS — N76.0 BACTERIAL VAGINOSIS: ICD-10-CM

## 2021-07-09 DIAGNOSIS — B96.89 BACTERIAL VAGINOSIS: ICD-10-CM

## 2021-07-09 PROCEDURE — 99213 OFFICE O/P EST LOW 20 MIN: CPT | Performed by: NURSE PRACTITIONER

## 2021-07-09 PROCEDURE — G8427 DOCREV CUR MEDS BY ELIG CLIN: HCPCS | Performed by: NURSE PRACTITIONER

## 2021-07-09 PROCEDURE — G8417 CALC BMI ABV UP PARAM F/U: HCPCS | Performed by: NURSE PRACTITIONER

## 2021-07-09 PROCEDURE — 1036F TOBACCO NON-USER: CPT | Performed by: NURSE PRACTITIONER

## 2021-07-09 RX ORDER — CLINDAMYCIN HYDROCHLORIDE 300 MG/1
300 CAPSULE ORAL 3 TIMES DAILY
Qty: 30 CAPSULE | Refills: 0 | Status: SHIPPED | OUTPATIENT
Start: 2021-07-09 | End: 2021-07-12 | Stop reason: SINTOL

## 2021-07-09 NOTE — PROGRESS NOTES
Ruperto Whaley (:  1985) is a 29 y.o. female,Established patient, here for evaluation of the following chief complaint(s): Wound Infection         ASSESSMENT/PLAN:  1. Umbilicus discharge  Stable; Wound cx ordered. Begin clindamycin TID. Call if worsening symptoms.  -     Culture, Wound  2. Bacterial vaginosis  Stable;  Begin clindamycin. Offered to re-swab patient- she declined at this time. Return if symptoms worsen or fail to improve. Subjective   SUBJECTIVE/OBJECTIVE:  HPI  Umbilicus drainage  Last Tuesday AM woke up with a crust around umbilicus  Yellow pus and blood  Was not painful- now is  Last Wednesday- went to ; gave her Bactrim (tomorrow is the last day) and bactroban ointment- has gotten worse    BV- clindamycin    Review of Systems       Objective   Physical Exam  Vitals reviewed. HENT:      Head: Normocephalic. Right Ear: External ear normal.      Left Ear: External ear normal.   Cardiovascular:      Rate and Rhythm: Normal rate and regular rhythm. Pulses: Normal pulses. Heart sounds: Normal heart sounds, S1 normal and S2 normal.   Pulmonary:      Effort: Pulmonary effort is normal.      Breath sounds: Normal breath sounds and air entry. Abdominal:      Palpations: Abdomen is soft. Tenderness: There is no abdominal tenderness. Comments: Old dried drainage present   Musculoskeletal:      Right lower leg: No edema. Left lower leg: No edema. Neurological:      Mental Status: She is alert. Psychiatric:         Mood and Affect: Mood normal.          An electronic signature was used to authenticate this note.     --VY Watkins - CNP

## 2021-07-11 LAB
GRAM STAIN RESULT: ABNORMAL
ORGANISM: ABNORMAL
WOUND/ABSCESS: ABNORMAL

## 2021-07-12 RX ORDER — CIPROFLOXACIN 500 MG/1
500 TABLET, FILM COATED ORAL 2 TIMES DAILY
Qty: 14 TABLET | Refills: 0 | Status: SHIPPED | OUTPATIENT
Start: 2021-07-12 | End: 2021-07-19

## 2021-07-14 ENCOUNTER — NURSE TRIAGE (OUTPATIENT)
Dept: OTHER | Facility: CLINIC | Age: 36
End: 2021-07-14

## 2021-07-14 DIAGNOSIS — B96.89 BACTERIAL VAGINOSIS: Primary | ICD-10-CM

## 2021-07-14 DIAGNOSIS — N76.0 BACTERIAL VAGINOSIS: Primary | ICD-10-CM

## 2021-07-14 RX ORDER — METRONIDAZOLE 7.5 MG/G
1 GEL VAGINAL DAILY
Qty: 1 TUBE | Refills: 0 | Status: SHIPPED | OUTPATIENT
Start: 2021-07-14 | End: 2021-07-19

## 2021-07-14 NOTE — TELEPHONE ENCOUNTER
Reason for Disposition   SEVERE abdominal pain (e.g., excruciating)    Answer Assessment - Initial Assessment Questions  1. APPEARANCE of BLOOD: \"What color is it? \" \"Is it passed separately, on the surface of the stool, or mixed in with the stool? \"       Red, mixed with stool    2. AMOUNT: \"How much blood was passed? \"     Mild to moderate    3. FREQUENCY: \"How many times has blood been passed with the stools? \"   Three    4. ONSET: \"When was the blood first seen in the stools? \" (Days or weeks)   7/11/2021    5. DIARRHEA: \"Is there also some diarrhea? \" If so, ask: \"How many diarrhea stools were passed in past 24 hours? \"    has diarrhea intermittently with loose stools, 3 times    6. CONSTIPATION: \"Do you have constipation? \" If so, \"How bad is it? \"  Intermittently    7. RECURRENT SYMPTOMS: \"Have you had blood in your stools before? \" If so, ask: \"When was the last time? \" and \"What happened that time? \"   No    8. BLOOD THINNERS: \"Do you take any blood thinners? \" (e.g., Coumadin/warfarin, Pradaxa/dabigatran, aspirin)  No    9. OTHER SYMPTOMS: \"Do you have any other symptoms? \"  (e.g., abdominal pain, vomiting, dizziness, fever)  Abdominal pain, vomiting, blood in vomit twice today, dizzy, nausea ( took one Zofran but it didn't help)    10. PREGNANCY: \"Is there any chance you are pregnant? \" \"When was your last menstrual period? \"   no chance    Protocols used: RECTAL BLEEDING-ADULT-[place]    Received call from Raquel Travis at [facility] with Red Flag Complaint. Brief description of triage: blood in stools, vomited with blood in it this morning as well, abdominal pain and dizziness. Triage indicates for patient to go to [facility]. Caller will go to [facility] now, has to be at work later this afternoon. Care advice provided, patient verbalizes understanding; denies any other questions or concerns; instructed to call back for any new or worsening symptoms. Attention Provider:   Thank you for allowing me to participate in the care of your patient. The patient was connected to triage in response to information provided to the E[facility]. Please do not respond through this encounter as the response is not directed to a shared pool.

## 2021-08-18 ENCOUNTER — PATIENT MESSAGE (OUTPATIENT)
Dept: FAMILY MEDICINE CLINIC | Age: 36
End: 2021-08-18

## 2021-08-19 ENCOUNTER — OFFICE VISIT (OUTPATIENT)
Dept: FAMILY MEDICINE CLINIC | Age: 36
End: 2021-08-19
Payer: COMMERCIAL

## 2021-08-19 VITALS
OXYGEN SATURATION: 97 % | HEART RATE: 104 BPM | HEIGHT: 66 IN | DIASTOLIC BLOOD PRESSURE: 85 MMHG | SYSTOLIC BLOOD PRESSURE: 130 MMHG | WEIGHT: 263.5 LBS | TEMPERATURE: 99.5 F | BODY MASS INDEX: 42.35 KG/M2

## 2021-08-19 DIAGNOSIS — N30.00 ACUTE CYSTITIS WITHOUT HEMATURIA: ICD-10-CM

## 2021-08-19 DIAGNOSIS — F40.10 SOCIAL ANXIETY DISORDER: Primary | ICD-10-CM

## 2021-08-19 DIAGNOSIS — L98.9 SKIN LESION: ICD-10-CM

## 2021-08-19 LAB
BILIRUBIN, POC: NORMAL
BLOOD URINE, POC: NORMAL
CLARITY, POC: NORMAL
COLOR, POC: NORMAL
GLUCOSE URINE, POC: NORMAL
KETONES, POC: NORMAL
LEUKOCYTE EST, POC: NORMAL
NITRITE, POC: POSITIVE
PH, POC: 5.5
PROTEIN, POC: NORMAL
SPECIFIC GRAVITY, POC: 1.02
UROBILINOGEN, POC: 0.2

## 2021-08-19 PROCEDURE — 99214 OFFICE O/P EST MOD 30 MIN: CPT | Performed by: NURSE PRACTITIONER

## 2021-08-19 PROCEDURE — G8427 DOCREV CUR MEDS BY ELIG CLIN: HCPCS | Performed by: NURSE PRACTITIONER

## 2021-08-19 PROCEDURE — 81002 URINALYSIS NONAUTO W/O SCOPE: CPT | Performed by: NURSE PRACTITIONER

## 2021-08-19 PROCEDURE — G8417 CALC BMI ABV UP PARAM F/U: HCPCS | Performed by: NURSE PRACTITIONER

## 2021-08-19 PROCEDURE — 1036F TOBACCO NON-USER: CPT | Performed by: NURSE PRACTITIONER

## 2021-08-19 RX ORDER — SULFAMETHOXAZOLE AND TRIMETHOPRIM 800; 160 MG/1; MG/1
1 TABLET ORAL 2 TIMES DAILY
Qty: 14 TABLET | Refills: 0 | Status: SHIPPED | OUTPATIENT
Start: 2021-08-19 | End: 2021-08-26

## 2021-08-19 RX ORDER — NITROFURANTOIN 25; 75 MG/1; MG/1
100 CAPSULE ORAL 2 TIMES DAILY
Qty: 10 CAPSULE | Refills: 0 | Status: SHIPPED | OUTPATIENT
Start: 2021-08-19 | End: 2021-08-19

## 2021-08-19 NOTE — PROGRESS NOTES
Linda Lucero (:  1985) is a 29 y.o. female,Established patient, here for evaluation of the following chief complaint(s):  Insect Bite (left elbow ) and Urinary Tract Infection         ASSESSMENT/PLAN:  1. Social anxiety disorder  Stable;  Restart Effexor. 2. Acute cystitis without hematuria  Stable;  UA positive for nitrites, blood and trace protein. Begin bactrim. Will send for a culture. Encourage plenty of fluids.  -     POCT Urinalysis no Micro  -     Culture, Urine  3. Skin lesion  Stable;  Discussed DD with patient. Will begin Bactrim since patient does believe it is a bite. Return if symptoms worsen or fail to improve. Subjective   SUBJECTIVE/OBJECTIVE:  HPI  Anxiety  Anxiety is high right now  Got out of nursing home on Tuesday AM  Stopped her Effexor and suboxone- cold turkey    UTI symptoms  Dysuria; no urgency or frequency  Is not urinating it all out   No flank pain  No fevers  Had to hand wash underwear while in nursing home    Skin lesion  Left elbow  Reports she got bit in nursing home  Has not tried anything    Review of Systems       Objective   Physical Exam  Vitals reviewed. Constitutional:       Appearance: Normal appearance. HENT:      Head: Normocephalic. Right Ear: External ear normal.      Left Ear: External ear normal.   Cardiovascular:      Rate and Rhythm: Normal rate and regular rhythm. Pulses: Normal pulses. Heart sounds: Normal heart sounds, S1 normal and S2 normal.   Pulmonary:      Effort: Pulmonary effort is normal.      Breath sounds: Normal breath sounds and air entry. Abdominal:      Palpations: Abdomen is soft. Tenderness: There is no abdominal tenderness. There is no right CVA tenderness or left CVA tenderness. Musculoskeletal:      Right lower leg: No edema. Left lower leg: No edema. Skin:            Comments: Papule present, slightly erythematous- appears to be a bite; DD cyst   Neurological:      Mental Status: She is alert.    Psychiatric:

## 2021-08-19 NOTE — TELEPHONE ENCOUNTER
From: Natalie Morrissey  To: VY Castro - CNP  Sent: 8/18/2021 5:59 PM EDT  Subject: Non-Urgent Medical Question    I got out of long term yesterday and in long term we had a issue with spider bites. I think I got bite .  The spot hurts and feels solid I noticed it 3 days ago

## 2021-08-21 LAB
ORGANISM: ABNORMAL
URINE CULTURE, ROUTINE: ABNORMAL

## 2021-08-22 RX ORDER — NITROFURANTOIN 25; 75 MG/1; MG/1
100 CAPSULE ORAL 2 TIMES DAILY
Qty: 10 CAPSULE | Refills: 0 | Status: SHIPPED | OUTPATIENT
Start: 2021-08-22 | End: 2022-05-31 | Stop reason: SDUPTHER

## 2022-03-16 ENCOUNTER — TELEMEDICINE (OUTPATIENT)
Dept: FAMILY MEDICINE CLINIC | Age: 37
End: 2022-03-16
Payer: COMMERCIAL

## 2022-03-16 ENCOUNTER — NURSE ONLY (OUTPATIENT)
Dept: FAMILY MEDICINE CLINIC | Age: 37
End: 2022-03-16

## 2022-03-16 DIAGNOSIS — R50.9 FEVER, UNSPECIFIED FEVER CAUSE: ICD-10-CM

## 2022-03-16 DIAGNOSIS — R09.82 POST-NASAL DRIP: ICD-10-CM

## 2022-03-16 DIAGNOSIS — J02.9 SORE THROAT: ICD-10-CM

## 2022-03-16 DIAGNOSIS — R05.9 COUGH: Primary | ICD-10-CM

## 2022-03-16 LAB
INFLUENZA A ANTIBODY: NORMAL
INFLUENZA B ANTIBODY: NORMAL
S PYO AG THROAT QL: NORMAL

## 2022-03-16 PROCEDURE — G8417 CALC BMI ABV UP PARAM F/U: HCPCS | Performed by: NURSE PRACTITIONER

## 2022-03-16 PROCEDURE — 87880 STREP A ASSAY W/OPTIC: CPT | Performed by: NURSE PRACTITIONER

## 2022-03-16 PROCEDURE — 1036F TOBACCO NON-USER: CPT | Performed by: NURSE PRACTITIONER

## 2022-03-16 PROCEDURE — G8484 FLU IMMUNIZE NO ADMIN: HCPCS | Performed by: NURSE PRACTITIONER

## 2022-03-16 PROCEDURE — G8427 DOCREV CUR MEDS BY ELIG CLIN: HCPCS | Performed by: NURSE PRACTITIONER

## 2022-03-16 PROCEDURE — 99213 OFFICE O/P EST LOW 20 MIN: CPT | Performed by: NURSE PRACTITIONER

## 2022-03-16 PROCEDURE — 87804 INFLUENZA ASSAY W/OPTIC: CPT | Performed by: NURSE PRACTITIONER

## 2022-03-16 SDOH — ECONOMIC STABILITY: FOOD INSECURITY: WITHIN THE PAST 12 MONTHS, THE FOOD YOU BOUGHT JUST DIDN'T LAST AND YOU DIDN'T HAVE MONEY TO GET MORE.: NEVER TRUE

## 2022-03-16 SDOH — ECONOMIC STABILITY: FOOD INSECURITY: WITHIN THE PAST 12 MONTHS, YOU WORRIED THAT YOUR FOOD WOULD RUN OUT BEFORE YOU GOT MONEY TO BUY MORE.: NEVER TRUE

## 2022-03-16 ASSESSMENT — ENCOUNTER SYMPTOMS
TROUBLE SWALLOWING: 0
VOMITING: 0
PHOTOPHOBIA: 0
VOICE CHANGE: 0
SHORTNESS OF BREATH: 0
NAUSEA: 0
EYE PAIN: 0
BACK PAIN: 0
WHEEZING: 0
CHOKING: 0
ABDOMINAL PAIN: 0
EYE ITCHING: 0
STRIDOR: 0
SINUS PRESSURE: 0
SORE THROAT: 1
EYE REDNESS: 0
COUGH: 1
CONSTIPATION: 0
CHEST TIGHTNESS: 0
SINUS PAIN: 0
BLOOD IN STOOL: 0
RHINORRHEA: 0
DIARRHEA: 0
COLOR CHANGE: 0
EYE DISCHARGE: 0

## 2022-03-16 ASSESSMENT — SOCIAL DETERMINANTS OF HEALTH (SDOH): HOW HARD IS IT FOR YOU TO PAY FOR THE VERY BASICS LIKE FOOD, HOUSING, MEDICAL CARE, AND HEATING?: NOT HARD AT ALL

## 2022-03-16 NOTE — PROGRESS NOTES
3/16/2022    TELEHEALTH EVALUATION -- Audio/Visual (During [id number]-[id number] public health emergency)    HPI:    Kobi Stout (:  1985) has requested an audio/video evaluation for the following concern(s):    She is being seen for acute overflow virtually. PCP Cruz Prieto. HPI    Cough: Pt complains of headache, cough, sore throat, and exposure to strep. Woke up yesterday and felt like she had something in her throat. Clearing her throat constantly. Last night felt like she had a cold \"in her throat\" Has tried robitussin and cold and flu. She hs gargling water, coughing hurts her head. Temp 101.4,  (at rest), O2 99%. She has not looked in the back of her throat, she cant eat. She is trying to drink. Review of Systems   Constitutional: Positive for activity change, fatigue and fever. Negative for appetite change, chills, diaphoresis and unexpected weight change. HENT: Positive for congestion, postnasal drip and sore throat. Negative for ear discharge, ear pain, hearing loss, nosebleeds, rhinorrhea, sinus pressure, sinus pain, sneezing, tinnitus, trouble swallowing and voice change. Eyes: Negative for photophobia, pain, discharge, redness and itching. Respiratory: Positive for cough. Negative for choking, chest tightness, shortness of breath, wheezing and stridor. Cardiovascular: Negative for chest pain, palpitations and leg swelling. Gastrointestinal: Negative for abdominal pain, blood in stool, constipation, diarrhea, nausea and vomiting. Endocrine: Negative for cold intolerance, heat intolerance, polydipsia and polyuria. Genitourinary: Negative for difficulty urinating, dysuria, enuresis, flank pain, frequency, hematuria and urgency. Musculoskeletal: Negative for back pain, gait problem, joint swelling, neck pain and neck stiffness. Skin: Negative for color change, pallor, rash and wound. Allergic/Immunologic: Negative for environmental allergies and food allergies. Neurological: Positive for headaches. Negative for dizziness, tremors, syncope, speech difficulty, weakness, light-headedness and numbness. Hematological: Negative for adenopathy. Does not bruise/bleed easily. Psychiatric/Behavioral: Negative for agitation, behavioral problems, confusion, decreased concentration, dysphoric mood, hallucinations, self-injury, sleep disturbance and suicidal ideas. The patient is not nervous/anxious and is not hyperactive. Prior to Visit Medications    Medication Sig Taking? Authorizing Provider   venlafaxine (EFFEXOR) 100 MG tablet TAKE 2 TABLETS BY MOUTH TWICE A DAY Yes VY Dunaway CNP   ondansetron (ZOFRAN) 4 MG tablet Take 1-2 tablets by mouth 3 times daily as needed for Nausea or Vomiting Yes VY Dunaway CNP   ferrous sulfate (FE TABS) 325 (65 Fe) MG EC tablet Take 1 tablet by mouth 2 times daily Yes VY Dunaway CNP   dicyclomine (BENTYL) 10 MG capsule Take 1 capsule by mouth 4 times daily (before meals and nightly) Yes VY Dunaway CNP   CVS STOOL SOFTENER 100 MG capsule TAKE 1 CAPSULE BY MOUTH DAILY AS NEEDED FOR CONSTIPATION Yes VY Dunaway CNP   vitamin D (CHOLECALCIFEROL) 50 MCG (2000 UT) TABS tablet Take 1 tablet by mouth daily Yes VY Dunaway CNP   Alum Hydroxide-Mag Carbonate (GAVISCON EXTRA STRENGTH) 159-935 MG/10ML SUSP Take 10 mLs by mouth 4 times daily Yes VY Dunaway CNP   fluticasone (FLONASE) 50 MCG/ACT nasal spray 1-2 sprays by Each Nostril route daily Yes VY Dunaway CNP   albuterol sulfate  (90 Base) MCG/ACT inhaler Inhale 2 puffs into the lungs every 6 hours as needed for Wheezing Yes Tony Adjutant, MD   colestipol (COLESTID) 1 g tablet Take 1 g by mouth 2 times daily Yes Historical Provider, MD   buprenorphine-naloxone (SUBOXONE) 8-2 MG SUBL SL tablet Place 1 tablet under the tongue daily.  Yes Historical Provider, MD   ibuprofen (IBU) 800 MG tablet Take 1 tablet by mouth every 8 hours as needed for Pain. Eve Martinez MD       Social History     Tobacco Use    Smoking status: Former Smoker     Packs/day: 0.50     Types: Cigarettes     Quit date: 7/24/2018     Years since quitting: 3.6    Smokeless tobacco: Never Used   Vaping Use    Vaping Use: Every day    Substances: Always   Substance Use Topics    Alcohol use: Not Currently    Drug use: No            PHYSICAL EXAMINATION:  [ INSTRUCTIONS:  \"[x]\" Indicates a positive item  \"[]\" Indicates a negative item  -- DELETE ALL ITEMS NOT EXAMINED]  Vital Signs: (As obtained by patient/caregiver or practitioner observation)    Constitutional: [] Appears well-developed and well-nourished [] No apparent distress      [x] Abnormal- Sounds congested and hard to generate voice. Mental status  [x] Alert and awake  [x] Oriented to person/place/time [x]Able to follow commands      Eyes:  EOM    [x]  Normal  [] Abnormal-  Sclera  [x]  Normal  [] Abnormal -         Discharge []  None visible  [] Abnormal -    HENT:   [x] Normocephalic, atraumatic.   [] Abnormal   [x] Mouth/Throat: Mucous membranes are moist.     External Ears [x] Normal  [] Abnormal-     Neck: [x] No visualized mass     Pulmonary/Chest: [x] Respiratory effort normal.  [x] No visualized signs of difficulty breathing or respiratory distress        [] Abnormal-      Musculoskeletal:   [x] Normal gait with no signs of ataxia         [x] Normal range of motion of neck        [] Abnormal-       Neurological:        [] No Facial Asymmetry (Cranial nerve 7 motor function) (limited exam to video visit)          [x] No gaze palsy        [] Abnormal-         Skin:        [x] No significant exanthematous lesions or discoloration noted on facial skin         [] Abnormal-            Psychiatric:       [x] Normal Affect [x] No Hallucinations        [] Abnormal-     Other pertinent observable physical exam findings-     Not SOB, O2 per pt 99%, HR elevated per pt, unable to eat because pt cant put her dentures in, makes her gag. Will get swabs for strep, flu and Covid. She needs Covid swab in order to return to work. Results for orders placed or performed in visit on 03/16/22   POCT Influenza A/B   Result Value Ref Range    Influenza A Ab neg     Influenza B Ab neg    POCT rapid strep A   Result Value Ref Range    Strep A Ag None Detected None Detected       Due to this being a TeleHealth encounter, evaluation of the following organ systems is limited: Vitals/Constitutional/EENT/Resp/CV/GI//MS/Neuro/Skin/Heme-Lymph-Imm. ASSESSMENT/PLAN:  1. Cough    - POCT Influenza A/B-NEG  - COVID-19  - POCT rapid strep A-NEG    2. Sore throat    - POCT Influenza A/B  - COVID-19  - POCT rapid strep A  - Culture, Throat    3. Post-nasal drip    - POCT Influenza A/B  - COVID-19  - POCT rapid strep A    4. Fever, unspecified fever cause    - POCT Influenza A/B  - COVID-19  - POCT rapid strep A    Stay off work until covid test and throat culture return, hydrate, treat fever and if worse go to ER. Recommended saltwater gargles, warm fluids with honey and a humidifier at night. Flonase, Zyrtec, NSAIDS as needed. Follow up if symptoms worsen or do not improve. Return if symptoms worsen or fail to improve. An  electronic signature was used to authenticate this note. --Sybil Paul, APRN - CNP on 3/16/2022 at 3:02 PM          This document was prepared by a combination of typing and transcription through a voice recognition software. Jose German, was evaluated through a synchronous (real-time) audio-video encounter. The patient (or guardian if applicable) is aware that this is a billable service, which includes applicable co-pays. This Virtual Visit was conducted with patient's (and/or legal guardian's) consent.  The visit was conducted pursuant to the emergency declaration under the [address] and the National Emergencies Act, [address] [facility] waiver authority and the [employer] and [employer] General Act. Patient identification was verified, and a caregiver was present when appropriate. The patient was located in a state where the provider was licensed to provide care. Services were provided through a video synchronous discussion virtually to substitute for in-person clinic visit.

## 2022-03-17 LAB — SARS-COV-2: NOT DETECTED

## 2022-03-19 LAB — THROAT CULTURE: NORMAL

## 2022-05-31 ENCOUNTER — OFFICE VISIT (OUTPATIENT)
Dept: FAMILY MEDICINE CLINIC | Age: 37
End: 2022-05-31
Payer: COMMERCIAL

## 2022-05-31 VITALS
RESPIRATION RATE: 16 BRPM | BODY MASS INDEX: 40.74 KG/M2 | OXYGEN SATURATION: 98 % | SYSTOLIC BLOOD PRESSURE: 110 MMHG | TEMPERATURE: 97.2 F | HEART RATE: 87 BPM | DIASTOLIC BLOOD PRESSURE: 84 MMHG | WEIGHT: 259.6 LBS | HEIGHT: 67 IN

## 2022-05-31 DIAGNOSIS — Z00.00 WELL ADULT EXAM: Primary | ICD-10-CM

## 2022-05-31 DIAGNOSIS — J30.2 SEASONAL ALLERGIES: ICD-10-CM

## 2022-05-31 DIAGNOSIS — F19.11 H/O DRUG ABUSE (HCC): ICD-10-CM

## 2022-05-31 DIAGNOSIS — R10.2 PELVIC PAIN: ICD-10-CM

## 2022-05-31 DIAGNOSIS — R82.90 MALODOROUS URINE: ICD-10-CM

## 2022-05-31 DIAGNOSIS — E55.9 VITAMIN D DEFICIENCY: ICD-10-CM

## 2022-05-31 DIAGNOSIS — K58.1 IRRITABLE BOWEL SYNDROME WITH CONSTIPATION: ICD-10-CM

## 2022-05-31 DIAGNOSIS — Z12.4 SCREENING FOR CERVICAL CANCER: ICD-10-CM

## 2022-05-31 DIAGNOSIS — F40.10 SOCIAL ANXIETY DISORDER: ICD-10-CM

## 2022-05-31 DIAGNOSIS — Z00.00 WELL ADULT EXAM: ICD-10-CM

## 2022-05-31 LAB
A/G RATIO: 1.5 (ref 1.1–2.2)
ALBUMIN SERPL-MCNC: 4 G/DL (ref 3.4–5)
ALP BLD-CCNC: 122 U/L (ref 40–129)
ALT SERPL-CCNC: 27 U/L (ref 10–40)
ANION GAP SERPL CALCULATED.3IONS-SCNC: 14 MMOL/L (ref 3–16)
AST SERPL-CCNC: 43 U/L (ref 15–37)
BACTERIA: ABNORMAL /HPF
BILIRUB SERPL-MCNC: 0.6 MG/DL (ref 0–1)
BILIRUBIN URINE: NEGATIVE
BILIRUBIN, POC: ABNORMAL
BLOOD URINE, POC: ABNORMAL
BLOOD, URINE: NEGATIVE
BUN BLDV-MCNC: 7 MG/DL (ref 7–20)
CALCIUM SERPL-MCNC: 9.2 MG/DL (ref 8.3–10.6)
CHLORIDE BLD-SCNC: 104 MMOL/L (ref 99–110)
CHOLESTEROL, TOTAL: 168 MG/DL (ref 0–199)
CLARITY, POC: ABNORMAL
CLARITY: ABNORMAL
CO2: 22 MMOL/L (ref 21–32)
COLOR, POC: ABNORMAL
COLOR: YELLOW
CREAT SERPL-MCNC: 0.6 MG/DL (ref 0.6–1.1)
EPITHELIAL CELLS, UA: 5 /HPF (ref 0–5)
GFR AFRICAN AMERICAN: >60
GFR NON-AFRICAN AMERICAN: >60
GLUCOSE BLD-MCNC: 83 MG/DL (ref 70–99)
GLUCOSE URINE, POC: ABNORMAL
GLUCOSE URINE: NEGATIVE MG/DL
HCT VFR BLD CALC: 38.5 % (ref 36–48)
HDLC SERPL-MCNC: 49 MG/DL (ref 40–60)
HEMOGLOBIN: 12.6 G/DL (ref 12–16)
HYALINE CASTS: 3 /LPF (ref 0–8)
KETONES, POC: ABNORMAL
KETONES, URINE: ABNORMAL MG/DL
LDL CHOLESTEROL CALCULATED: 99 MG/DL
LEUKOCYTE EST, POC: ABNORMAL
LEUKOCYTE ESTERASE, URINE: ABNORMAL
MCH RBC QN AUTO: 26.1 PG (ref 26–34)
MCHC RBC AUTO-ENTMCNC: 32.7 G/DL (ref 31–36)
MCV RBC AUTO: 79.8 FL (ref 80–100)
MICROSCOPIC EXAMINATION: YES
NITRITE, POC: ABNORMAL
NITRITE, URINE: NEGATIVE
PDW BLD-RTO: 15.5 % (ref 12.4–15.4)
PH UA: 5.5 (ref 5–8)
PH, POC: 6
PLATELET # BLD: 196 K/UL (ref 135–450)
PMV BLD AUTO: 9.8 FL (ref 5–10.5)
POTASSIUM SERPL-SCNC: 4 MMOL/L (ref 3.5–5.1)
PROTEIN UA: NEGATIVE MG/DL
PROTEIN, POC: ABNORMAL
RBC # BLD: 4.83 M/UL (ref 4–5.2)
RBC UA: 3 /HPF (ref 0–4)
SODIUM BLD-SCNC: 140 MMOL/L (ref 136–145)
SPECIFIC GRAVITY UA: 1.02 (ref 1–1.03)
SPECIFIC GRAVITY, POC: 1.03
TOTAL PROTEIN: 6.6 G/DL (ref 6.4–8.2)
TRIGL SERPL-MCNC: 99 MG/DL (ref 0–150)
URINE TYPE: ABNORMAL
UROBILINOGEN, POC: 0.2
UROBILINOGEN, URINE: 1 E.U./DL
VLDLC SERPL CALC-MCNC: 20 MG/DL
WBC # BLD: 5.4 K/UL (ref 4–11)
WBC UA: 3 /HPF (ref 0–5)

## 2022-05-31 PROCEDURE — 81002 URINALYSIS NONAUTO W/O SCOPE: CPT | Performed by: NURSE PRACTITIONER

## 2022-05-31 PROCEDURE — 99395 PREV VISIT EST AGE 18-39: CPT | Performed by: NURSE PRACTITIONER

## 2022-05-31 RX ORDER — NITROFURANTOIN 25; 75 MG/1; MG/1
100 CAPSULE ORAL 2 TIMES DAILY
Qty: 10 CAPSULE | Refills: 0 | Status: SHIPPED | OUTPATIENT
Start: 2022-05-31 | End: 2022-06-05

## 2022-05-31 RX ORDER — CHOLECALCIFEROL (VITAMIN D3) 50 MCG
2000 TABLET ORAL DAILY
Qty: 90 TABLET | Refills: 1 | Status: SHIPPED | OUTPATIENT
Start: 2022-05-31

## 2022-05-31 RX ORDER — FLUTICASONE PROPIONATE 50 MCG
1-2 SPRAY, SUSPENSION (ML) NASAL DAILY
Qty: 1 EACH | Refills: 2 | Status: SHIPPED | OUTPATIENT
Start: 2022-05-31 | End: 2022-07-14 | Stop reason: SDUPTHER

## 2022-05-31 RX ORDER — CETIRIZINE HYDROCHLORIDE 10 MG/1
10 TABLET ORAL DAILY
Qty: 30 TABLET | Refills: 2 | Status: SHIPPED | OUTPATIENT
Start: 2022-05-31 | End: 2022-06-30

## 2022-05-31 ASSESSMENT — PATIENT HEALTH QUESTIONNAIRE - PHQ9
SUM OF ALL RESPONSES TO PHQ9 QUESTIONS 1 & 2: 0
2. FEELING DOWN, DEPRESSED OR HOPELESS: 0
SUM OF ALL RESPONSES TO PHQ QUESTIONS 1-9: 0
2. FEELING DOWN, DEPRESSED OR HOPELESS: NOT AT ALL
1. LITTLE INTEREST OR PLEASURE IN DOING THINGS: NOT AT ALL
SUM OF ALL RESPONSES TO PHQ9 QUESTIONS 1 & 2: 0
SUM OF ALL RESPONSES TO PHQ QUESTIONS 1-9: 0
SUM OF ALL RESPONSES TO PHQ QUESTIONS 1-9: 0
1. LITTLE INTEREST OR PLEASURE IN DOING THINGS: 0
SUM OF ALL RESPONSES TO PHQ QUESTIONS 1-9: 0

## 2022-05-31 NOTE — PROGRESS NOTES
2022    Bert Gabriel (:  1985) is a 40 y.o. female, here for a preventive medicine evaluation. Patient Active Problem List   Diagnosis    Depression    H/O drug abuse ([facility] [address]    Acute hepatitis C virus infection without hepatic coma    Social anxiety disorder    Rectocele    Positive H. pylori test    Irritable bowel syndrome with constipation    History of post traumatic stress disorder    History of manic depressive disorder     Social anxiety disorder  Did not give her Effexor when she was in [employer]- never restarted it  Sweating has improved  Mood has been great    History of drug abuse  [facility]  Is on Suboxone    Allergies  Cetirizine- helps  Uses flonase- helps    Vitamin D deficiency  Had stopped taking vitamin D supplement- wants refilled    IBS  Has improved  Does not need Bentyl  Sometimes will use stool softener PRN    Dark urine  Does not always occur in the AM  Has a strong odor  Sometimes cannot hold it    Diet: is looking into going vegan- less red meat, eating more tofu; cannot do dairy anymore; does not eat out anymore    Exercise: goes to the gym now- started 1.5 months ago; walking on the treadmill, steps    LMP: - has monthly    Had an abnormal pap a long time ago - did colposcopy; + FH cervical cancer  Review of Systems    Prior to Visit Medications    Medication Sig Taking?  Authorizing Provider   fluticasone (FLONASE) 50 MCG/ACT nasal spray 1-2 sprays by Each Nostril route daily Yes VY Mcintyre CNP   vitamin D (CHOLECALCIFEROL) 50 MCG ( UT) TABS tablet Take 1 tablet by mouth daily Yes VY Mcintyre CNP   cetirizine (ZYRTEC) 10 MG tablet Take 1 tablet by mouth daily Yes VY Mcintyre CNP   nitrofurantoin, macrocrystal-monohydrate, (MACROBID) 100 MG capsule Take 1 capsule by mouth 2 times daily for 5 days Yes VY Mcintyre CNP   dicyclomine (BENTYL) 10 MG capsule Take 1 capsule by mouth 4 times daily (before meals and nightly) Yes VY Koenig CNP   [employer] STOOL SOFTENER 100 MG capsule TAKE 1 CAPSULE BY MOUTH DAILY AS NEEDED FOR CONSTIPATION Yes VY Koenig CNP   Alum Hydroxide-Mag Carbonate (GAVISCON EXTRA STRENGTH) 198-764 MG/10ML SUSP Take 10 mLs by mouth 4 times daily Yes VY Koenig CNP   albuterol sulfate  (90 Base) MCG/ACT inhaler Inhale 2 puffs into the lungs every 6 hours as needed for Wheezing Yes Arlyn Vogel MD   colestipol (COLESTID) 1 g tablet Take 1 g by mouth 2 times daily Yes Historical Provider, MD   buprenorphine-naloxone (SUBOXONE) 8-2 MG SUBL SL tablet Place 1 tablet under the tongue daily. Total 20 mg daily Yes Historical Provider, MD   ondansetron (ZOFRAN) 4 MG tablet Take 1-2 tablets by mouth 3 times daily as needed for Nausea or Vomiting  VY Koenig CNP   ferrous sulfate (FE TABS) 325 (65 Fe) MG EC tablet Take 1 tablet by mouth 2 times daily  Patient not taking: Reported on 5/31/2022  VY Koenig CNP   ibuprofen (IBU) 800 MG tablet Take 1 tablet by mouth every 8 hours as needed for Pain. Epifanio Jackson MD        Allergies   Allergen Reactions    Acetaminophen      Hep C    Hydrocodone-Acetaminophen Itching    Prednisone Other (See Comments)     Per patient- all over body pain.         Past Medical History:   Diagnosis Date    Asthma     Hepatitis C     [facility] abuse ([facility] [address]     IBS (irritable bowel syndrome)     PID        Past Surgical History:   Procedure Laterality Date    CHOLECYSTECTOMY      GALLBLADDER SURGERY      TUBAL LIGATION         Social History     Socioeconomic History    Marital status: Single     Spouse name: Not on file    Number of children: Not on file    Years of education: Not on file    Highest education level: Not on file   Occupational History    Not on file   Tobacco Use    Smoking status: Former Smoker Packs/day: 0.50     Types: Cigarettes     Quit date: 7/24/2018     Years since quitting: 3.8    Smokeless tobacco: Never Used   Vaping Use    Vaping Use: Every day    Substances: Always   Substance and Sexual Activity    Alcohol use: Not Currently    Drug use: No    Sexual activity: Yes     Partners: Male   Other Topics Concern    Not on file   Social History Narrative    Not on file     Social Determinants of Health     Financial Resource Strain: Low Risk     Difficulty of Paying Living Expenses: Not hard at all   Food Insecurity: No Food Insecurity    Worried About Running Out of Food in the Last Year: Never true    Fabiola of Food in the Last Year: Never true   Transportation Needs: No Transportation Needs    Lack of Transportation (Medical): No    Lack of Transportation (Non-Medical):  No   Physical Activity:     Days of Exercise per Week: Not on file    Minutes of Exercise per Session: Not on file   Stress:     Feeling of Stress : Not on file   Social Connections:     Frequency of Communication with Friends and Family: Not on file    Frequency of Social Gatherings with Friends and Family: Not on file    Attends Spiritism Services: Not on file    Active Member of [address] [place] [address] or Organizations: Not on file    Attends Club or Organization Meetings: Not on file    Marital Status: Not on file   Intimate Partner Violence:     Fear of Current or Ex-Partner: Not on file    Emotionally Abused: Not on file    Physically Abused: Not on file    Sexually Abused: Not on file   Housing Stability:     Unable to Pay for Housing in the Last Year: Not on file    Number of [place] in the Last Year: Not on file    Unstable Housing in the Last Year: Not on file        Family History   Problem Relation Age of Onset    Hypothyroidism Mother     Diabetes Father     Heart Disease Father     High Blood Pressure Father     High Cholesterol Father        ADVANCE DIRECTIVE: N, <no information>    Vitals: 05/31/22 1110   BP: 110/84   Pulse: 87   Resp: 16   Temp: 97.2 °F (36.2 °C)   TempSrc: Tympanic   SpO2: 98%   Weight: 259 lb 9.6 oz (117.8 kg)   Height: 5' 7\" (1.702 m)     Estimated body mass index is 40.66 kg/m² as calculated from the following:    Height as of this encounter: 5' 7\" (1.702 m). Weight as of this encounter: 259 lb 9.6 oz (117.8 kg). Physical Exam  Vitals reviewed. Constitutional:       Appearance: Normal appearance. HENT:      Head: Normocephalic. Right Ear: Tympanic membrane, ear canal and external ear normal.      Left Ear: Tympanic membrane, ear canal and external ear normal.      Nose: Nose normal.      Mouth/Throat:      Lips: Pink. Mouth: Mucous membranes are moist.      Pharynx: Oropharynx is clear. Uvula midline. Cardiovascular:      Rate and Rhythm: Normal rate and regular rhythm. Pulses: Normal pulses. Heart sounds: Normal heart sounds, S1 normal and S2 normal.   Pulmonary:      Effort: Pulmonary effort is normal.      Breath sounds: Normal breath sounds and air entry. Abdominal:      Palpations: Abdomen is soft. Tenderness: There is no abdominal tenderness. Genitourinary:     Labia:         Right: No rash or lesion. Left: No rash or lesion. Vagina: Normal.      Cervix: Normal.      Uterus: Normal.       Adnexa:         Right: Tenderness present. Comments: Rectocele present  Musculoskeletal:      Right lower leg: No edema. Left lower leg: No edema. Neurological:      Mental Status: She is alert. Psychiatric:         Mood and Affect: Mood normal.       No flowsheet data found.     Lab Results   Component Value Date    CHOL 168 05/31/2022    CHOL 182 03/22/2021    CHOL 192 01/28/2020    TRIG 99 05/31/2022    TRIG 177 03/22/2021    TRIG 105 01/28/2020    HDL 49 05/31/2022    HDL 46 03/22/2021    HDL 54 01/28/2020    LDLCALC 99 05/31/2022    LDLCALC 101 03/22/2021    LDLCALC 117 01/28/2020    GLUCOSE 83 05/31/2022       The ASCVD Risk score (Vikash Juarez, et al., 2013) failed to calculate for the following reasons: The 2013 ASCVD risk score is only valid for ages 36 to 78    Immunization History   Administered Date(s) Administered    COVID-19, Pfizer Purple top, DILUTE for use, 12+ yrs, 30mcg/0.3mL dose 04/06/2021, 05/17/2021, 12/24/2021    DTP 1985, 1985, 1985, 05/10/1991    Hepatitis A Adult (Havrix, Vaqta) 08/02/2018    Hepatitis B 09/21/1999, 10/21/1999, 08/15/2000    Influenza Vaccine, unspecified formulation 10/31/2014    Influenza Virus Vaccine 10/28/1998, 11/08/2003, 10/11/2004, 10/17/2005, 10/31/2014, 09/23/2015, 10/25/2018    Influenza, Ora Daniel, IM, PF (6 mo and older Fluzone, Flulaval, Fluarix, and 3 yrs and older Afluria) 10/23/2020    MMR 11/13/1987, 09/21/1999    Polio IPV (IPOL) 1986, 1986, 1986, 05/10/1991    Td, unspecified formulation 03/12/2003    Tdap (Boostrix, Adacel) 05/05/2014    Varicella (Varivax) 05/12/1998       Health Maintenance   Topic Date Due    Varicella vaccine (2 of 2 - 2-dose childhood series) 08/04/1998    Flu vaccine (Season Ended) 09/01/2022    Depression Monitoring  05/31/2023    DTaP/Tdap/Td vaccine (6 - Td or Tdap) 05/05/2024    Cervical cancer screen  05/31/2027    Hepatitis B vaccine  Completed    COVID-19 Vaccine  Completed    HIV screen  Completed    Hepatitis A vaccine  Aged Out    Hib vaccine  Aged Out    Meningococcal (ACWY) vaccine  Aged Out    Pneumococcal 0-64 years Vaccine  Aged Out       Assessment & Plan   Well adult exam  Stable;  39 yo wellness exam.  Labs ordered. Continue to eat well and exercise. -     Lipid Panel; Future  -     Comprehensive Metabolic Panel; Future  -     CBC; Future  Social anxiety disorder  Stable;  Patient doing well without Effexor. Will monitor. H/O drug abuse ([facility] [address]  Stable;  Patient doing well on suboxone. Continue current regimen as prescribed by [facility].   Seasonal allergies  Stable;  Patient doing well the zyrtec and flonase. Continue current regimen. Malodorous urine  Stable; Not progressing;  UA: trace intact blood and nitrite positive. Begin macrobid. Will send for a UA micro and culture. -     POCT Urinalysis no Micro  -     Culture, Urine  -     Urinalysis with Microscopic  Screening for cervical cancer  Stable;  Pap completed. Discussed results will be back in 1 week. If normal, next pap due in 5 years. -     PAP SMEAR  Pelvic pain  Stable; Not progressing; Will get transvaginal US.  -     [facility]; Future    Return in about 1 year (around 5/31/2023), or if symptoms worsen or fail to improve.          --Carl Miller, APRN - CNP

## 2022-06-02 LAB
HPV COMMENT: NORMAL
HPV TYPE 16: NOT DETECTED
HPV TYPE 18: NOT DETECTED
HPVOH (OTHER TYPES): NOT DETECTED

## 2022-06-03 LAB
ORGANISM: ABNORMAL
URINE CULTURE, ROUTINE: ABNORMAL

## 2022-07-05 RX ORDER — ALBUTEROL SULFATE 90 UG/1
2 AEROSOL, METERED RESPIRATORY (INHALATION) EVERY 6 HOURS PRN
OUTPATIENT
Start: 2022-07-05

## 2022-07-13 ENCOUNTER — PATIENT MESSAGE (OUTPATIENT)
Dept: FAMILY MEDICINE CLINIC | Age: 37
End: 2022-07-13

## 2022-07-13 RX ORDER — ALBUTEROL SULFATE 90 UG/1
2 AEROSOL, METERED RESPIRATORY (INHALATION) EVERY 6 HOURS PRN
Qty: 1 EACH | Refills: 0 | Status: SHIPPED | OUTPATIENT
Start: 2022-07-13 | End: 2022-08-01 | Stop reason: SDUPTHER

## 2022-07-13 NOTE — TELEPHONE ENCOUNTER
From: Germain Gao  To: Flora Myers  Sent: 7/13/2022 10:36 AM EDT  Subject: Refill     Can I please get a refill of my Inhaler. I'm having trouble breathing in. I have been getting coughing fits and having to use an I halet to stop them and get my breath back. i tried hitting refill and it went to the [facility] office.

## 2022-07-13 NOTE — TELEPHONE ENCOUNTER
Medication:   Requested Prescriptions     Pending Prescriptions Disp Refills    albuterol sulfate HFA (PROVENTIL;VENTOLIN;PROAIR) 108 (90 Base) MCG/ACT inhaler       Sig: Inhale 2 puffs into the lungs every 6 hours as needed for Wheezing        Last Filled:  10/12/2020    Patient Phone Number: [phone] (home)     Last appt: 5/31/2022   Next appt: Visit date not found    Last OARRS: No flowsheet data found.

## 2022-07-14 RX ORDER — FLUTICASONE PROPIONATE 50 MCG
1-2 SPRAY, SUSPENSION (ML) NASAL DAILY
Qty: 1 EACH | Refills: 2 | Status: SHIPPED | OUTPATIENT
Start: 2022-07-14 | End: 2022-09-02 | Stop reason: SDUPTHER

## 2022-07-14 NOTE — TELEPHONE ENCOUNTER
Medication:   Requested Prescriptions     Pending Prescriptions Disp Refills    fluticasone (FLONASE) 50 MCG/ACT nasal spray 1 each 2     Si-2 sprays by Each Nostril route daily        Last Filled: 2022   Last appt: 2022   Next appt: none

## 2022-08-01 ENCOUNTER — TELEPHONE (OUTPATIENT)
Dept: FAMILY MEDICINE CLINIC | Age: 37
End: 2022-08-01

## 2022-08-01 RX ORDER — ALBUTEROL SULFATE 90 UG/1
2 AEROSOL, METERED RESPIRATORY (INHALATION) EVERY 6 HOURS PRN
Qty: 1 EACH | Refills: 0 | Status: SHIPPED | OUTPATIENT
Start: 2022-08-01 | End: 2022-08-04 | Stop reason: SDUPTHER

## 2022-08-01 NOTE — TELEPHONE ENCOUNTER
----- Message from [facility] sent at 8/1/2022  2:39 PM EDT -----  Subject: Appointment Request    Reason for Call: Established Patient Appointment needed: Urgent Cough Cold    QUESTIONS    Reason for appointment request? No appointments available during search     Additional Information for Provider? Please call patient to schedule an VV   appt for Covid.  Please call  ---------------------------------------------------------------------------  --------------  Zahira MOREIRA  [phone]; OK to leave message on voicemail  ---------------------------------------------------------------------------  --------------  SCRIPT ANSWERS  COVID Screen: Rossi Lopez

## 2022-08-02 ENCOUNTER — TELEMEDICINE (OUTPATIENT)
Dept: FAMILY MEDICINE CLINIC | Age: 37
End: 2022-08-02
Payer: COMMERCIAL

## 2022-08-02 DIAGNOSIS — R05.9 COUGH: ICD-10-CM

## 2022-08-02 DIAGNOSIS — R52 BODY ACHES: ICD-10-CM

## 2022-08-02 DIAGNOSIS — R06.2 WHEEZING: ICD-10-CM

## 2022-08-02 DIAGNOSIS — R09.81 NASAL CONGESTION: Primary | ICD-10-CM

## 2022-08-02 PROCEDURE — G8427 DOCREV CUR MEDS BY ELIG CLIN: HCPCS | Performed by: NURSE PRACTITIONER

## 2022-08-02 PROCEDURE — 1036F TOBACCO NON-USER: CPT | Performed by: NURSE PRACTITIONER

## 2022-08-02 PROCEDURE — G8417 CALC BMI ABV UP PARAM F/U: HCPCS | Performed by: NURSE PRACTITIONER

## 2022-08-02 PROCEDURE — 99213 OFFICE O/P EST LOW 20 MIN: CPT | Performed by: NURSE PRACTITIONER

## 2022-08-02 RX ORDER — CETIRIZINE HYDROCHLORIDE 10 MG/1
TABLET ORAL
COMMUNITY
Start: 2022-07-14 | End: 2022-09-07

## 2022-08-02 NOTE — PROGRESS NOTES
Shaina Narayanan (:  1985) is a Established patient, here for evaluation of the following:    Assessment & Plan   Below is the assessment and plan developed based on review of pertinent history, physical exam, labs, studies, and medications. 1. Nasal congestion  Stable; Not progressing;  Per patient 2 positive home COVID tests. Patient declines PCR and discussed risks and benefits of paxlovid. Patient declines medication. Continue supportive measures- warm salt water gargles, warm tea with honey, fluids, Ibuprofen, flonase and steam.  2. Cough  Stable;  See 1. Continue inhaler. Per patient is allergic to steroids. Will monitor closely. 3. Wheezing  Stable;  See 2.  4. Body aches  Stable;  See 1    No follow-ups on file. Subjective   HPI  Symptoms started on Saturday PM  COVID positive  afternoon, yesterday afternoon- both positive  Cannot taste or smell  Nasal congestion  Chest congestion  Cough- dry  + wheezing, + shortness of breath; can get in coughing fits  No chest pain or chest tightening  Headaches  Cold sweats, body aches  Robitussin  Advil    Declines PCR and paxlovid    Review of Systems       Objective   Patient-Reported Vitals  No data recorded     Physical Exam  Constitutional:       General: She is not in acute distress. Appearance: Normal appearance. She is not ill-appearing or toxic-appearing. HENT:      Head: Normocephalic. Right Ear: External ear normal.      Left Ear: External ear normal.      Nose: Congestion present. Pulmonary:      Effort: No respiratory distress. Comments: Talking in full sentences, no audible wheezing  Neurological:      Mental Status: She is alert. Psychiatric:         Mood and Affect: Mood normal.     Shaina Narayanan, was evaluated through a synchronous (real-time) audio-video encounter. The patient (or guardian if applicable) is aware that this is a billable service, which includes applicable co-pays.  This Virtual Visit was conducted with patient's (and/or legal guardian's) consent. The visit was conducted pursuant to the emergency declaration under the [facility] [address] authority and the [employer] Act. Patient identification was verified, and a caregiver was present when appropriate. The patient was located at Home: [place] [address]. Provider was located at [facility] (Appt Dept): [address] [place] [address].         --VY Zuleta - CNP

## 2022-08-03 ENCOUNTER — PATIENT MESSAGE (OUTPATIENT)
Dept: FAMILY MEDICINE CLINIC | Age: 37
End: 2022-08-03

## 2022-08-04 RX ORDER — ALBUTEROL SULFATE 90 UG/1
2 AEROSOL, METERED RESPIRATORY (INHALATION) EVERY 6 HOURS PRN
Qty: 1 EACH | Refills: 0 | OUTPATIENT
Start: 2022-08-04

## 2022-08-04 RX ORDER — ALBUTEROL SULFATE 90 UG/1
2 AEROSOL, METERED RESPIRATORY (INHALATION) EVERY 6 HOURS PRN
Qty: 1 EACH | Refills: 0 | Status: SHIPPED | OUTPATIENT
Start: 2022-08-04 | End: 2022-08-22 | Stop reason: SDUPTHER

## 2022-08-04 NOTE — TELEPHONE ENCOUNTER
Medication:   Requested Prescriptions     Pending Prescriptions Disp Refills    albuterol sulfate HFA (PROVENTIL;VENTOLIN;PROAIR) 108 (90 Base) MCG/ACT inhaler 1 each 0     Sig: Inhale 2 puffs into the lungs every 6 hours as needed for Wheezing        Last Filled:    08/01/2022  Patient Phone Number: [phone] (home)     Last appt: 8/2/2022   Next appt: Visit date not found    Last OARRS: No flowsheet data found.

## 2022-08-04 NOTE — TELEPHONE ENCOUNTER
Duplicate request.    Outpatient Medication Detail   Disp Refills Start End    albuterol sulfate HFA (PROVENTIL;VENTOLIN;PROAIR) 108 (90 Base) MCG/ACT inhaler 1 each 0 8/1/2022     Sig - Route: Inhale 2 puffs into the lungs every 6 hours as needed for Wheezing - Inhalation    Sent to pharmacy as:  Albuterol Sulfate  (90 Base) MCG/ACT Inhalation Aerosol Solution (PROVENTIL;VENTOLIN;PROAIR)    E-Prescribing Status: Receipt confirmed by pharmacy (8/1/2022 12:58 PM EDT)

## 2022-08-04 NOTE — TELEPHONE ENCOUNTER
From: Everett Britton  To: Isaura Hooks  Sent: 8/3/2022 6:59 PM EDT  Subject: Inhaler    I request a new inhaler around aug1st. I ran out of my inhaler a little early I've been having issues with testing postive for covid and having wheezing issues. I can't get a refill untill after Aug 13th. The pharmacy said unless u guys call in for a new inhaler I can get one. I need one asap please.

## 2022-08-05 ENCOUNTER — NURSE ONLY (OUTPATIENT)
Dept: FAMILY MEDICINE CLINIC | Age: 37
End: 2022-08-05

## 2022-08-05 DIAGNOSIS — R05.9 COUGH: ICD-10-CM

## 2022-08-05 DIAGNOSIS — R10.2 PELVIC PAIN: ICD-10-CM

## 2022-08-05 DIAGNOSIS — R05.9 COUGH: Primary | ICD-10-CM

## 2022-08-05 DIAGNOSIS — Z11.52 ENCOUNTER FOR SCREENING FOR COVID-19: Primary | ICD-10-CM

## 2022-08-06 LAB — SARS-COV-2, PCR: DETECTED

## 2022-08-20 RX ORDER — ALBUTEROL SULFATE 90 UG/1
2 AEROSOL, METERED RESPIRATORY (INHALATION) EVERY 6 HOURS PRN
Qty: 1 EACH | Refills: 0 | Status: CANCELLED | OUTPATIENT
Start: 2022-08-20

## 2022-08-22 DIAGNOSIS — R05.9 COUGH: Primary | ICD-10-CM

## 2022-08-22 DIAGNOSIS — J45.41 MODERATE PERSISTENT ASTHMA WITH ACUTE EXACERBATION: Primary | ICD-10-CM

## 2022-08-22 RX ORDER — ALBUTEROL SULFATE 90 UG/1
2 AEROSOL, METERED RESPIRATORY (INHALATION) EVERY 6 HOURS PRN
Qty: 1 EACH | Refills: 0 | Status: SHIPPED | OUTPATIENT
Start: 2022-08-22 | End: 2022-09-26

## 2022-08-22 NOTE — TELEPHONE ENCOUNTER
Medication:   Requested Prescriptions     Pending Prescriptions Disp Refills    albuterol sulfate HFA (PROVENTIL;VENTOLIN;PROAIR) 108 (90 Base) MCG/ACT inhaler 1 each 0     Sig: Inhale 2 puffs into the lungs every 6 hours as needed for Wheezing        Last Filled:  08/04/2022    Patient Phone Number: [phone] (home)     Last appt: 8/2/2022   Next appt: Visit date not found    Last OARRS: No flowsheet data found.

## 2022-09-02 RX ORDER — FLUTICASONE PROPIONATE 50 MCG
1-2 SPRAY, SUSPENSION (ML) NASAL DAILY
Qty: 1 EACH | Refills: 2 | Status: CANCELLED | OUTPATIENT
Start: 2022-09-02

## 2022-09-02 RX ORDER — FLUTICASONE PROPIONATE 50 MCG
1-2 SPRAY, SUSPENSION (ML) NASAL DAILY
Qty: 1 EACH | Refills: 2 | Status: SHIPPED | OUTPATIENT
Start: 2022-09-02

## 2022-09-07 RX ORDER — CETIRIZINE HYDROCHLORIDE 10 MG/1
TABLET ORAL
Qty: 30 TABLET | Refills: 2 | Status: SHIPPED | OUTPATIENT
Start: 2022-09-07

## 2022-09-07 NOTE — TELEPHONE ENCOUNTER
Medication:   Requested Prescriptions     Pending Prescriptions Disp Refills    cetirizine (ZYRTEC) 10 MG tablet [Pharmacy Med Name: CETIRIZINE HCL 10 MG TABLET] 30 tablet 2     Sig: TAKE 1 TABLET BY MOUTH EVERY DAY        Last Filled:      Patient Phone Number: [phone] (home)     Last appt: 8/2/2022   Next appt: Visit date not found    Last OARRS: No flowsheet data found.

## 2022-09-24 DIAGNOSIS — J45.41 MODERATE PERSISTENT ASTHMA WITH ACUTE EXACERBATION: ICD-10-CM

## 2022-09-26 NOTE — TELEPHONE ENCOUNTER
Medication:   Requested Prescriptions     Pending Prescriptions Disp Refills    VENTOLIN  (90 Base) MCG/ACT inhaler [Pharmacy Med Name: VENTOLIN HFA 90 MCG INHALER] 18 each      Sig: TAKE 2 PUFFS BY MOUTH EVERY 6 HOURS AS NEEDED FOR WHEEZE        Last Filled: 8/22/2022   Last appt: 8/2/2022   Next appt: none

## 2022-09-30 NOTE — TELEPHONE ENCOUNTER
Last Fill 11/6/20  Last Office Visit 12/10/20   Return in about 3 months (around 3/10/2021).   No Pending Appointments 94

## 2022-11-16 DIAGNOSIS — T14.8XXA SKIN EXCORIATION: Primary | ICD-10-CM

## 2022-11-16 RX ORDER — MUPIROCIN CALCIUM 20 MG/G
CREAM TOPICAL
Qty: 1 EACH | Refills: 0 | Status: SHIPPED | OUTPATIENT
Start: 2022-11-16 | End: 2022-12-16

## 2022-12-12 RX ORDER — CETIRIZINE HYDROCHLORIDE 10 MG/1
TABLET ORAL
Qty: 30 TABLET | Refills: 2 | Status: SHIPPED | OUTPATIENT
Start: 2022-12-12

## 2022-12-12 NOTE — TELEPHONE ENCOUNTER
Medication:   Requested Prescriptions     Pending Prescriptions Disp Refills    cetirizine (ZYRTEC) 10 MG tablet [Pharmacy Med Name: CETIRIZINE HCL 10 MG TABLET] 30 tablet 2     Sig: TAKE 1 TABLET BY MOUTH EVERY DAY        Last Filled:  9/7/22    Patient Phone Number: [phone] (home)     Last appt: 8/2/2022   Next appt: Visit date not found    Last OARRS: No flowsheet data found.

## 2022-12-19 DIAGNOSIS — T14.8XXA SKIN EXCORIATION: ICD-10-CM

## 2022-12-19 RX ORDER — MUPIROCIN CALCIUM 20 MG/G
CREAM TOPICAL
Qty: 15 G | Refills: 1 | Status: SHIPPED | OUTPATIENT
Start: 2022-12-19

## 2022-12-19 NOTE — TELEPHONE ENCOUNTER
Medication:   Requested Prescriptions     Pending Prescriptions Disp Refills    mupirocin (BACTROBAN) 2 % cream [Pharmacy Med Name: MUPIROCIN 2% CREAM] 15 g      Sig: APPLY TO AFFECTED AREA 3 TIMES A DAY        Last Filled:  11/16/22    Patient Phone Number: [phone] (home)     Last appt: 8/2/2022   Next appt: Visit date not found    Last OARRS: No flowsheet data found.

## 2023-02-14 DIAGNOSIS — J45.41 MODERATE PERSISTENT ASTHMA WITH ACUTE EXACERBATION: ICD-10-CM

## 2023-02-14 RX ORDER — FLUTICASONE PROPIONATE 50 MCG
SPRAY, SUSPENSION (ML) NASAL
Qty: 1 EACH | Refills: 2 | Status: SHIPPED | OUTPATIENT
Start: 2023-02-14

## 2023-02-14 NOTE — TELEPHONE ENCOUNTER
Medication:   Requested Prescriptions     Pending Prescriptions Disp Refills    VENTOLIN  (90 Base) MCG/ACT inhaler [Pharmacy Med Name: VENTOLIN HFA 90 MCG INHALER] 18 each 2     Sig: INHALE 2 PUFFS BY MOUTH EVERY 6 HOURS AS NEEDED FOR WHEEZE        Last Filled:  9/26/22    Patient Phone Number: [phone] (home)     Last appt: 8/2/2022   Next appt: Visit date not found    Last OARRS: No flowsheet data found.

## 2023-02-14 NOTE — TELEPHONE ENCOUNTER
Medication:   Requested Prescriptions     Pending Prescriptions Disp Refills    fluticasone (FLONASE) 50 MCG/ACT nasal spray [Pharmacy Med Name: FLUTICASONE PROP 50 MCG SPRAY]  2     Si-2 SPRAYS BY EACH NOSTRIL ROUTE EVERY DAY        Last Filled:  22    Patient Phone Number: [phone] (home)     Last appt: 2022   Next appt: Visit date not found    Last OARRS: No flowsheet data found.

## 2023-04-21 DIAGNOSIS — J45.41 MODERATE PERSISTENT ASTHMA WITH ACUTE EXACERBATION: ICD-10-CM

## 2023-04-21 RX ORDER — CETIRIZINE HYDROCHLORIDE 10 MG/1
TABLET ORAL
Qty: 30 TABLET | Refills: 2 | Status: SHIPPED | OUTPATIENT
Start: 2023-04-21

## 2023-04-21 RX ORDER — UMECLIDINIUM BROMIDE AND VILANTEROL TRIFENATATE 62.5; 25 UG/1; UG/1
POWDER RESPIRATORY (INHALATION)
Qty: 60 EACH | Refills: 5 | Status: SHIPPED | OUTPATIENT
Start: 2023-04-21

## 2023-04-21 NOTE — TELEPHONE ENCOUNTER
Medication:   Requested Prescriptions     Pending Prescriptions Disp Refills    ANORO ELLIPTA 62.5-25 MCG/ACT inhaler [Pharmacy Med Name: Gage Membreno 62.5-25 MCG INH] 60 each 5     Sig: TAKE 1 PUFF BY MOUTH EVERY DAY        Last Filled:      Patient Phone Number: [phone] (home)     Last appt: 8/2/2022   Next appt: 4/21/2023    Last OARRS: No flowsheet data found.

## 2023-04-21 NOTE — TELEPHONE ENCOUNTER
Medication:   Requested Prescriptions     Pending Prescriptions Disp Refills    cetirizine (ZYRTEC) 10 MG tablet [Pharmacy Med Name: CETIRIZINE HCL 10 MG TABLET] 30 tablet 2     Sig: TAKE 1 TABLET BY MOUTH EVERY DAY        Last Filled:  12/12/22    Patient Phone Number: [phone] (home)     Last appt: 8/2/2022   Next appt: Visit date not found    Last OARRS: No flowsheet data found.

## 2023-04-29 DIAGNOSIS — J45.41 MODERATE PERSISTENT ASTHMA WITH ACUTE EXACERBATION: ICD-10-CM

## 2023-05-01 RX ORDER — ALBUTEROL SULFATE 90 UG/1
AEROSOL, METERED RESPIRATORY (INHALATION)
Qty: 18 EACH | Refills: 1 | Status: SHIPPED | OUTPATIENT
Start: 2023-05-01 | End: 2023-05-05 | Stop reason: SDUPTHER

## 2023-05-01 NOTE — TELEPHONE ENCOUNTER
Medication:   Requested Prescriptions     Pending Prescriptions Disp Refills    VENTOLIN  (90 Base) MCG/ACT inhaler [Pharmacy Med Name: VENTOLIN HFA 90 MCG INHALER] 18 each 1     Sig: INHALE 2 PUFFS BY MOUTH EVERY 6 HOURS AS NEEDED FOR WHEEZING        Last Filled: 2/14/2023   Last appt: 8/2/2022   Next appt: none

## 2023-05-05 ENCOUNTER — OFFICE VISIT (OUTPATIENT)
Dept: FAMILY MEDICINE CLINIC | Age: 38
End: 2023-05-05
Payer: COMMERCIAL

## 2023-05-05 VITALS
BODY MASS INDEX: 38.14 KG/M2 | TEMPERATURE: 97.2 F | SYSTOLIC BLOOD PRESSURE: 124 MMHG | HEART RATE: 76 BPM | DIASTOLIC BLOOD PRESSURE: 82 MMHG | HEIGHT: 67 IN | WEIGHT: 243 LBS | OXYGEN SATURATION: 97 %

## 2023-05-05 DIAGNOSIS — J45.41 MODERATE PERSISTENT ASTHMA WITH ACUTE EXACERBATION: Primary | ICD-10-CM

## 2023-05-05 DIAGNOSIS — B35.3 TINEA PEDIS OF RIGHT FOOT: ICD-10-CM

## 2023-05-05 PROCEDURE — G8417 CALC BMI ABV UP PARAM F/U: HCPCS | Performed by: NURSE PRACTITIONER

## 2023-05-05 PROCEDURE — 99214 OFFICE O/P EST MOD 30 MIN: CPT | Performed by: NURSE PRACTITIONER

## 2023-05-05 PROCEDURE — G8427 DOCREV CUR MEDS BY ELIG CLIN: HCPCS | Performed by: NURSE PRACTITIONER

## 2023-05-05 PROCEDURE — 1036F TOBACCO NON-USER: CPT | Performed by: NURSE PRACTITIONER

## 2023-05-05 RX ORDER — ALBUTEROL SULFATE 90 UG/1
AEROSOL, METERED RESPIRATORY (INHALATION)
Qty: 18 EACH | Refills: 1 | Status: SHIPPED | OUTPATIENT
Start: 2023-05-05

## 2023-05-05 RX ORDER — BUDESONIDE AND FORMOTEROL FUMARATE DIHYDRATE 160; 4.5 UG/1; UG/1
2 AEROSOL RESPIRATORY (INHALATION) 2 TIMES DAILY
Qty: 10.2 G | Refills: 5 | Status: SHIPPED | OUTPATIENT
Start: 2023-05-05

## 2023-05-05 SDOH — ECONOMIC STABILITY: INCOME INSECURITY: HOW HARD IS IT FOR YOU TO PAY FOR THE VERY BASICS LIKE FOOD, HOUSING, MEDICAL CARE, AND HEATING?: NOT HARD AT ALL

## 2023-05-05 SDOH — ECONOMIC STABILITY: FOOD INSECURITY: WITHIN THE PAST 12 MONTHS, THE FOOD YOU BOUGHT JUST DIDN'T LAST AND YOU DIDN'T HAVE MONEY TO GET MORE.: NEVER TRUE

## 2023-05-05 SDOH — ECONOMIC STABILITY: FOOD INSECURITY: WITHIN THE PAST 12 MONTHS, YOU WORRIED THAT YOUR FOOD WOULD RUN OUT BEFORE YOU GOT MONEY TO BUY MORE.: NEVER TRUE

## 2023-05-05 SDOH — ECONOMIC STABILITY: HOUSING INSECURITY
IN THE LAST 12 MONTHS, WAS THERE A TIME WHEN YOU DID NOT HAVE A STEADY PLACE TO SLEEP OR SLEPT IN A SHELTER (INCLUDING NOW)?: NO

## 2023-05-05 ASSESSMENT — PATIENT HEALTH QUESTIONNAIRE - PHQ9
SUM OF ALL RESPONSES TO PHQ9 QUESTIONS 1 & 2: 2
SUM OF ALL RESPONSES TO PHQ QUESTIONS 1-9: 13
SUM OF ALL RESPONSES TO PHQ QUESTIONS 1-9: 13
5. POOR APPETITE OR OVEREATING: 2
1. LITTLE INTEREST OR PLEASURE IN DOING THINGS: 2
4. FEELING TIRED OR HAVING LITTLE ENERGY: 3
8. MOVING OR SPEAKING SO SLOWLY THAT OTHER PEOPLE COULD HAVE NOTICED. OR THE OPPOSITE, BEING SO FIGETY OR RESTLESS THAT YOU HAVE BEEN MOVING AROUND A LOT MORE THAN USUAL: 0
3. TROUBLE FALLING OR STAYING ASLEEP: 3
7. TROUBLE CONCENTRATING ON THINGS, SUCH AS READING THE NEWSPAPER OR WATCHING TELEVISION: 3
SUM OF ALL RESPONSES TO PHQ QUESTIONS 1-9: 13
10. IF YOU CHECKED OFF ANY PROBLEMS, HOW DIFFICULT HAVE THESE PROBLEMS MADE IT FOR YOU TO DO YOUR WORK, TAKE CARE OF THINGS AT HOME, OR GET ALONG WITH OTHER PEOPLE: 1
2. FEELING DOWN, DEPRESSED OR HOPELESS: 0
9. THOUGHTS THAT YOU WOULD BE BETTER OFF DEAD, OR OF HURTING YOURSELF: 0
SUM OF ALL RESPONSES TO PHQ QUESTIONS 1-9: 13

## 2023-05-05 NOTE — PROGRESS NOTES
Tamie Rendon (:  1985) is a 41 y.o. female,Established patient, here for evaluation of the following chief complaint(s):  Medication Management and Tinea Pedis       ASSESSMENT/PLAN:  1. Moderate persistent asthma with acute exacerbation  Stable; Not progressing; Stop breo. Begin symbicort. Continue albuterol PRN. PFTs ordered. -     albuterol sulfate HFA (VENTOLIN HFA) 108 (90 Base) MCG/ACT inhaler; INHALE 2 PUFFS BY MOUTH EVERY 6 HOURS AS NEEDED FOR WHEEZING, Disp-18 each, R-1Normal  -     budesonide-formoterol (SYMBICORT) 160-4.5 MCG/ACT AERO; Inhale 2 puffs into the lungs 2 times daily, Disp-10.2 g, R-5Normal  -     Full PFT Study With Bronchodilator; Future  2. Tinea pedis of right foot  Stable; Not progressing;  Begin ciclopirox BID x 4 weeks. -     ciclopirox (LOPROX) 0.77 % cream; Apply topically 2 times daily. , Disp-30 g, R-1, Normal    Return in about 3 months (around 2023) for physical.       Subjective   SUBJECTIVE/OBJECTIVE:  HPI  Asthma  Is worse  Walks a lot- takes 15 minutes to walk to get to her department  Feels wheezy  Cannot take a breath to take inhaler  Does not feel like it is worse with allergies  Does vape  Ran out of albuterol; uses it 5x/day  Is using Anoro- has not gotten better with it; would like to try Symbicort    Foot- right  In between little toes  Skin is peeling  It is not itchy  Thinks she bought clotrimazole- used x2 days    Got a new job at [employer] reviewed. Constitutional:       Appearance: Normal appearance. HENT:      Head: Normocephalic. Right Ear: External ear normal.      Left Ear: External ear normal.   Cardiovascular:      Rate and Rhythm: Normal rate and regular rhythm. Pulses: Normal pulses. Heart sounds: Normal heart sounds, S1 normal and S2 normal.   Pulmonary:      Effort: Pulmonary effort is normal.      Breath sounds: Normal breath sounds and air entry.

## 2023-05-30 RX ORDER — FLUTICASONE PROPIONATE 50 MCG
SPRAY, SUSPENSION (ML) NASAL
Qty: 1 EACH | Refills: 2 | Status: SHIPPED | OUTPATIENT
Start: 2023-05-30

## 2023-05-30 NOTE — TELEPHONE ENCOUNTER
Medication:   Requested Prescriptions     Pending Prescriptions Disp Refills    fluticasone (FLONASE) 50 MCG/ACT nasal spray [Pharmacy Med Name: FLUTICASONE PROP 50 MCG SPRAY]  2     Sig: INSTILL 1-2 SPRAYS IN EACH NOSTRIL DAILY        Last Filled:  2/14/23    Patient Phone Number: [phone] (home)     Last appt: 5/5/2023   Next appt: Visit date not found    Last OARRS: No flowsheet data found.

## 2023-07-06 ENCOUNTER — PATIENT MESSAGE (OUTPATIENT)
Dept: FAMILY MEDICINE CLINIC | Age: 38
End: 2023-07-06

## 2023-10-23 RX ORDER — FLUTICASONE PROPIONATE 50 MCG
SPRAY, SUSPENSION (ML) NASAL
Qty: 1 EACH | Refills: 2 | Status: SHIPPED | OUTPATIENT
Start: 2023-10-23

## 2023-10-23 NOTE — TELEPHONE ENCOUNTER
Medication:   Requested Prescriptions     Pending Prescriptions Disp Refills    fluticasone (FLONASE) 50 MCG/ACT nasal spray 1 each 2     Sig: INSTILL 1-2 SPRAYS IN EACH NOSTRIL DAILY        Last Filled:      Patient Phone Number: [phone] (home)     Last appt: 5/5/2023   Next appt: Visit date not found    Last OARRS:        No data to display

## 2023-10-26 DIAGNOSIS — J45.41 MODERATE PERSISTENT ASTHMA WITH ACUTE EXACERBATION: ICD-10-CM

## 2023-10-26 RX ORDER — ALBUTEROL SULFATE 90 UG/1
AEROSOL, METERED RESPIRATORY (INHALATION)
Qty: 18 EACH | Refills: 1 | Status: SHIPPED | OUTPATIENT
Start: 2023-10-26

## 2024-01-25 ENCOUNTER — OFFICE VISIT (OUTPATIENT)
Dept: FAMILY MEDICINE CLINIC | Age: 39
End: 2024-01-25
Payer: COMMERCIAL

## 2024-01-25 VITALS
WEIGHT: 264 LBS | HEART RATE: 76 BPM | HEIGHT: 66 IN | OXYGEN SATURATION: 96 % | DIASTOLIC BLOOD PRESSURE: 72 MMHG | BODY MASS INDEX: 42.43 KG/M2 | SYSTOLIC BLOOD PRESSURE: 118 MMHG

## 2024-01-25 DIAGNOSIS — N81.6 RECTOCELE: ICD-10-CM

## 2024-01-25 DIAGNOSIS — K21.9 GASTROESOPHAGEAL REFLUX DISEASE WITHOUT ESOPHAGITIS: ICD-10-CM

## 2024-01-25 DIAGNOSIS — R63.5 WEIGHT GAIN: ICD-10-CM

## 2024-01-25 DIAGNOSIS — Z13.220 SCREENING FOR LIPID DISORDERS: ICD-10-CM

## 2024-01-25 DIAGNOSIS — Z13.0 SCREENING FOR IRON DEFICIENCY ANEMIA: ICD-10-CM

## 2024-01-25 DIAGNOSIS — Z86.19 HISTORY OF HELICOBACTER PYLORI INFECTION: ICD-10-CM

## 2024-01-25 DIAGNOSIS — J01.90 ACUTE BACTERIAL SINUSITIS: Primary | ICD-10-CM

## 2024-01-25 DIAGNOSIS — B96.89 ACUTE BACTERIAL SINUSITIS: Primary | ICD-10-CM

## 2024-01-25 DIAGNOSIS — Z13.1 SCREENING FOR DIABETES MELLITUS: ICD-10-CM

## 2024-01-25 PROCEDURE — 99214 OFFICE O/P EST MOD 30 MIN: CPT | Performed by: NURSE PRACTITIONER

## 2024-01-25 PROCEDURE — 1036F TOBACCO NON-USER: CPT | Performed by: NURSE PRACTITIONER

## 2024-01-25 PROCEDURE — G8427 DOCREV CUR MEDS BY ELIG CLIN: HCPCS | Performed by: NURSE PRACTITIONER

## 2024-01-25 PROCEDURE — G8417 CALC BMI ABV UP PARAM F/U: HCPCS | Performed by: NURSE PRACTITIONER

## 2024-01-25 PROCEDURE — G8484 FLU IMMUNIZE NO ADMIN: HCPCS | Performed by: NURSE PRACTITIONER

## 2024-01-25 RX ORDER — DOXYCYCLINE HYCLATE 100 MG
100 TABLET ORAL 2 TIMES DAILY
Qty: 14 TABLET | Refills: 0 | Status: SHIPPED | OUTPATIENT
Start: 2024-01-25 | End: 2024-02-01

## 2024-01-25 RX ORDER — OMEPRAZOLE 40 MG/1
40 CAPSULE, DELAYED RELEASE ORAL
Qty: 90 CAPSULE | Refills: 1 | Status: SHIPPED | OUTPATIENT
Start: 2024-01-25

## 2024-01-25 ASSESSMENT — PATIENT HEALTH QUESTIONNAIRE - PHQ9
3. TROUBLE FALLING OR STAYING ASLEEP: SEVERAL DAYS
SUM OF ALL RESPONSES TO PHQ9 QUESTIONS 1 & 2: 0
6. FEELING BAD ABOUT YOURSELF - OR THAT YOU ARE A FAILURE OR HAVE LET YOURSELF OR YOUR FAMILY DOWN: NOT AT ALL
9. THOUGHTS THAT YOU WOULD BE BETTER OFF DEAD, OR OF HURTING YOURSELF: 0
6. FEELING BAD ABOUT YOURSELF - OR THAT YOU ARE A FAILURE OR HAVE LET YOURSELF OR YOUR FAMILY DOWN: 0
SUM OF ALL RESPONSES TO PHQ QUESTIONS 1-9: 2
2. FEELING DOWN, DEPRESSED OR HOPELESS: NOT AT ALL
10. IF YOU CHECKED OFF ANY PROBLEMS, HOW DIFFICULT HAVE THESE PROBLEMS MADE IT FOR YOU TO DO YOUR WORK, TAKE CARE OF THINGS AT HOME, OR GET ALONG WITH OTHER PEOPLE: NOT DIFFICULT AT ALL
4. FEELING TIRED OR HAVING LITTLE ENERGY: SEVERAL DAYS
7. TROUBLE CONCENTRATING ON THINGS, SUCH AS READING THE NEWSPAPER OR WATCHING TELEVISION: 0
10. IF YOU CHECKED OFF ANY PROBLEMS, HOW DIFFICULT HAVE THESE PROBLEMS MADE IT FOR YOU TO DO YOUR WORK, TAKE CARE OF THINGS AT HOME, OR GET ALONG WITH OTHER PEOPLE: 0
7. TROUBLE CONCENTRATING ON THINGS, SUCH AS READING THE NEWSPAPER OR WATCHING TELEVISION: NOT AT ALL
SUM OF ALL RESPONSES TO PHQ QUESTIONS 1-9: 2
1. LITTLE INTEREST OR PLEASURE IN DOING THINGS: 0
1. LITTLE INTEREST OR PLEASURE IN DOING THINGS: NOT AT ALL
2. FEELING DOWN, DEPRESSED OR HOPELESS: 0
8. MOVING OR SPEAKING SO SLOWLY THAT OTHER PEOPLE COULD HAVE NOTICED. OR THE OPPOSITE, BEING SO FIGETY OR RESTLESS THAT YOU HAVE BEEN MOVING AROUND A LOT MORE THAN USUAL: 0
SUM OF ALL RESPONSES TO PHQ QUESTIONS 1-9: 2
5. POOR APPETITE OR OVEREATING: NOT AT ALL
SUM OF ALL RESPONSES TO PHQ QUESTIONS 1-9: 2
9. THOUGHTS THAT YOU WOULD BE BETTER OFF DEAD, OR OF HURTING YOURSELF: NOT AT ALL
SUM OF ALL RESPONSES TO PHQ QUESTIONS 1-9: 2
8. MOVING OR SPEAKING SO SLOWLY THAT OTHER PEOPLE COULD HAVE NOTICED. OR THE OPPOSITE - BEING SO FIDGETY OR RESTLESS THAT YOU HAVE BEEN MOVING AROUND A LOT MORE THAN USUAL: NOT AT ALL
5. POOR APPETITE OR OVEREATING: 0
4. FEELING TIRED OR HAVING LITTLE ENERGY: 1

## 2024-01-25 NOTE — PROGRESS NOTES
Gregoria Glover (:  1985) is a 38 y.o. female,Established patient, here for evaluation of the following chief complaint(s):  Follow-up (A lot of conditions)       ASSESSMENT/PLAN:  1. Acute bacterial sinusitis  Not progressing;  Stop Afrin and flonase.  Continue nasal saline.  Encouraged steam, humidifier and fluids.  Stop Amox and begin Doxycycline.  -     doxycycline hyclate (VIBRA-TABS) 100 MG tablet; Take 1 tablet by mouth 2 times daily for 7 days, Disp-14 tablet, R-0Normal  2. Gastroesophageal reflux disease without esophagitis  Not progressing;  Concerns for h-pylori, however patient already on omeprazole from UC.  Discussed stopping x 14 days vs. Increasing dose since symptoms not well controlled and referral to GI.  Will do latter.  Referral made.  -     Nikita Gaffney MD, Gastroenterology, [facility]  -     omeprazole (PRILOSEC) 40 MG delayed release capsule; Take 1 capsule by mouth every morning (before breakfast), Disp-90 capsule, R-1Normal  3. History of Helicobacter pylori infection  Not progressing;  See 2  -     Nikita Gaffney MD, Gastroenterology, [facility]  4. Rectocele  Not progressing;  Seen on previous pap.  Referral to urogynecology.  -     Maryann Tamayo, BHAKTI, Urogynecology, [facility]  5. Weight gain  Not progressing;  Referral to weight management.  TSH ordered.  -     [facility] (Bariatric Surgery), [place]  -     TSH; Future  6. Screening for lipid disorders  Stable;  Labs ordered.  -     Lipid Panel; Future  -     Comprehensive Metabolic Panel; Future  7. Screening for diabetes mellitus  Stable;  Labs ordered.  -     Hemoglobin A1C; Future  8. Screening for iron deficiency anemia  Stable;  Labs ordered.  -     CBC; Future    Return in about 3 months (around 2024), or if symptoms worsen or fail to improve.       Subjective   SUBJECTIVE/OBJECTIVE:  HPI  Sinusitis  > 1 month  - Amox BID x 10 days- helped

## 2024-04-12 ENCOUNTER — OFFICE VISIT (OUTPATIENT)
Dept: FAMILY MEDICINE CLINIC | Age: 39
End: 2024-04-12
Payer: COMMERCIAL

## 2024-04-12 VITALS
OXYGEN SATURATION: 96 % | WEIGHT: 267 LBS | BODY MASS INDEX: 42.91 KG/M2 | HEIGHT: 66 IN | SYSTOLIC BLOOD PRESSURE: 110 MMHG | DIASTOLIC BLOOD PRESSURE: 60 MMHG | HEART RATE: 80 BPM

## 2024-04-12 DIAGNOSIS — J45.40 MODERATE PERSISTENT ASTHMA WITHOUT COMPLICATION: ICD-10-CM

## 2024-04-12 DIAGNOSIS — N89.8 VAGINAL DISCHARGE: Primary | ICD-10-CM

## 2024-04-12 DIAGNOSIS — R35.0 URINARY FREQUENCY: ICD-10-CM

## 2024-04-12 DIAGNOSIS — R21 SKIN RASH: ICD-10-CM

## 2024-04-12 DIAGNOSIS — R39.15 URINARY URGENCY: ICD-10-CM

## 2024-04-12 PROBLEM — J45.41 MODERATE PERSISTENT ASTHMA WITH ACUTE EXACERBATION: Status: ACTIVE | Noted: 2024-04-12

## 2024-04-12 LAB
BILIRUBIN, POC: NORMAL
BLOOD URINE, POC: NORMAL
CLARITY, POC: NORMAL
COLOR, POC: NORMAL
GLUCOSE URINE, POC: NORMAL
KETONES, POC: NORMAL
LEUKOCYTE EST, POC: NORMAL
NITRITE, POC: NORMAL
PH, POC: 6.5
PROTEIN, POC: NORMAL
SPECIFIC GRAVITY, POC: 1.02
UROBILINOGEN, POC: NORMAL

## 2024-04-12 PROCEDURE — G8427 DOCREV CUR MEDS BY ELIG CLIN: HCPCS | Performed by: NURSE PRACTITIONER

## 2024-04-12 PROCEDURE — 81002 URINALYSIS NONAUTO W/O SCOPE: CPT | Performed by: NURSE PRACTITIONER

## 2024-04-12 PROCEDURE — G8417 CALC BMI ABV UP PARAM F/U: HCPCS | Performed by: NURSE PRACTITIONER

## 2024-04-12 PROCEDURE — 1036F TOBACCO NON-USER: CPT | Performed by: NURSE PRACTITIONER

## 2024-04-12 PROCEDURE — 99214 OFFICE O/P EST MOD 30 MIN: CPT | Performed by: NURSE PRACTITIONER

## 2024-04-12 RX ORDER — CEPHALEXIN 250 MG/1
250 CAPSULE ORAL 4 TIMES DAILY
Qty: 28 CAPSULE | Refills: 0 | Status: SHIPPED | OUTPATIENT
Start: 2024-04-12

## 2024-04-12 NOTE — PROGRESS NOTES
Gregoria Glover (:  1985) is a 38 y.o. female,Established patient, here for evaluation of the following chief complaint(s):  Skin Problem (Patient stated she having vaginal issue  patient stated she a rash on her bikini are )       ASSESSMENT/PLAN:  1. Vaginal discharge  Not progressing;  Vaginal swab completed by patient.  Will f/u on results.  -     VAGINAL PATHOGENS PROBE *A  2. Urinary urgency  Not progressing;  UA: negative.  Will send for culture.  Continue to drink plenty of water and avoid bladder irritants.  -     POCT Urinalysis no Micro  -     Culture, Urine  3. Urinary frequency  Not progressing;  See 2  -     POCT Urinalysis no Micro  -     Culture, Urine  4. Skin rash  Not progressing;  Begin cephalexin.  Encouraged washing with abx soap and keeping the area dry.  -     cephALEXin (KEFLEX) 250 MG capsule; Take 1 capsule by mouth 4 times daily, Disp-28 capsule, R-0Normal  5. Moderate persistent asthma without complications  Stable;  No current symptoms.    Return if symptoms worsen or fail to improve.       Subjective   SUBJECTIVE/OBJECTIVE:  HPI  Vaginal discharge  Just got done with menses > 9 days ago  Brown discharge  Unsure if thick or thin  Has a \"weird\" smell  Not something she has had before  No dysuria  Has urgency and frequency  No fevers or chills  No abdominal or flank pain  Denies concerns for STDs    Rash- Bikini area  Found it Monday  Was hot in the plant  Was itching  Oozed green; was malodorous  Is getting better- is not as raw  Cleaning with alcohol, using bactroban    Review of Systems       Objective   Physical Exam  Vitals reviewed.   Constitutional:       Appearance: Normal appearance.   HENT:      Head: Normocephalic.      Right Ear: External ear normal.      Left Ear: External ear normal.      Nose: Nose normal.   Cardiovascular:      Rate and Rhythm: Normal rate and regular rhythm.      Heart sounds: Normal heart sounds, S1 normal and S2 normal.   Pulmonary:

## 2024-04-13 LAB
CANDIDA DNA VAG QL NAA+PROBE: NORMAL
G VAGINALIS DNA SPEC QL NAA+PROBE: NORMAL
T VAGINALIS DNA VAG QL NAA+PROBE: NORMAL

## 2024-04-14 LAB — BACTERIA UR CULT: NORMAL

## 2024-04-23 ENCOUNTER — TELEPHONE (OUTPATIENT)
Dept: BARIATRICS/WEIGHT MGMT | Age: 39
End: 2024-04-23

## 2024-07-08 ENCOUNTER — HOSPITAL ENCOUNTER (OUTPATIENT)
Dept: GENERAL RADIOLOGY | Age: 39
Discharge: HOME | End: 2024-07-08
Payer: COMMERCIAL

## 2024-07-08 ENCOUNTER — OFFICE VISIT (OUTPATIENT)
Dept: FAMILY MEDICINE CLINIC | Age: 39
End: 2024-07-08
Payer: COMMERCIAL

## 2024-07-08 VITALS
OXYGEN SATURATION: 98 % | HEIGHT: 66 IN | HEART RATE: 77 BPM | SYSTOLIC BLOOD PRESSURE: 132 MMHG | DIASTOLIC BLOOD PRESSURE: 70 MMHG | TEMPERATURE: 98.3 F | BODY MASS INDEX: 42.11 KG/M2 | WEIGHT: 262 LBS

## 2024-07-08 DIAGNOSIS — R52 BODY ACHES: ICD-10-CM

## 2024-07-08 DIAGNOSIS — R53.83 OTHER FATIGUE: ICD-10-CM

## 2024-07-08 DIAGNOSIS — R06.89 DECREASED BREATH SOUNDS: ICD-10-CM

## 2024-07-08 DIAGNOSIS — R06.89 DECREASED BREATH SOUNDS: Primary | ICD-10-CM

## 2024-07-08 LAB — S PYO AG THROAT QL: NORMAL

## 2024-07-08 PROCEDURE — 1036F TOBACCO NON-USER: CPT | Performed by: NURSE PRACTITIONER

## 2024-07-08 PROCEDURE — 87880 STREP A ASSAY W/OPTIC: CPT | Performed by: NURSE PRACTITIONER

## 2024-07-08 PROCEDURE — G8417 CALC BMI ABV UP PARAM F/U: HCPCS | Performed by: NURSE PRACTITIONER

## 2024-07-08 PROCEDURE — 99213 OFFICE O/P EST LOW 20 MIN: CPT | Performed by: NURSE PRACTITIONER

## 2024-07-08 PROCEDURE — 71046 X-RAY EXAM CHEST 2 VIEWS: CPT

## 2024-07-08 PROCEDURE — G8427 DOCREV CUR MEDS BY ELIG CLIN: HCPCS | Performed by: NURSE PRACTITIONER

## 2024-07-08 SDOH — ECONOMIC STABILITY: INCOME INSECURITY: HOW HARD IS IT FOR YOU TO PAY FOR THE VERY BASICS LIKE FOOD, HOUSING, MEDICAL CARE, AND HEATING?: NOT HARD AT ALL

## 2024-07-08 SDOH — ECONOMIC STABILITY: FOOD INSECURITY: WITHIN THE PAST 12 MONTHS, YOU WORRIED THAT YOUR FOOD WOULD RUN OUT BEFORE YOU GOT MONEY TO BUY MORE.: NEVER TRUE

## 2024-07-08 SDOH — ECONOMIC STABILITY: FOOD INSECURITY: WITHIN THE PAST 12 MONTHS, THE FOOD YOU BOUGHT JUST DIDN'T LAST AND YOU DIDN'T HAVE MONEY TO GET MORE.: NEVER TRUE

## 2024-07-08 NOTE — PROGRESS NOTES
Gregoria Glover (:  1985) is a 39 y.o. female,Established patient, here for evaluation of the following chief complaint(s):  Fatigue and Generalized Body Aches      Assessment & Plan   1. Decreased breath sounds  Not progressing;  CXR and labs ordered.  COVID sent.  Continue albuterol PRN.  Discussed steam and humidifier.  -     XR CHEST (2 VW); Future  -     Comprehensive Metabolic Panel; Future  -     CBC with Auto Differential; Future  2. Other fatigue  Not progressing;  Rapid strep: negative.  Will send for throat cx and COVID.    -     POCT rapid strep A  -     COVID-19  -     Culture, Throat  3. Body aches  Not progressing;  See 1 and 2.  Continue Tylenol PRN.  -     COVID-19  -     Culture, Throat      Return in about 3 months (around 10/8/2024), or if symptoms worsen or fail to improve, for asthma f/u.       Subjective   HPI  Symptoms started last   Got a taste in her mouth that she was getting sick  Woke up the next morning- couldn't taste or smell; improved by Saturday PM  Fatigue  Body aches  Sweating  Fevers  Runny nose  No congestion  Headaches  Nausea  No cough  No sore throat  Has had shortness of breath- feels like panic attacks  Tylenol  Dayquil  Albuterol- has needed more often since summer started; qday  Symbicort     Is getting better    Review of Systems       Objective   Physical Exam  Vitals reviewed.   Constitutional:       Appearance: Normal appearance.   HENT:      Head: Normocephalic.      Right Ear: Tympanic membrane, ear canal and external ear normal.      Left Ear: Tympanic membrane, ear canal and external ear normal.      Nose: Nose normal.      Mouth/Throat:      Lips: Pink.      Mouth: Mucous membranes are moist.      Pharynx: Oropharynx is clear. Uvula midline.   Cardiovascular:      Rate and Rhythm: Normal rate and regular rhythm.      Heart sounds: Normal heart sounds, S1 normal and S2 normal.   Pulmonary:      Effort: Pulmonary effort is normal.      Breath

## 2024-07-09 LAB
ALBUMIN SERPL-MCNC: 4.2 G/DL (ref 3.4–5)
ALBUMIN/GLOB SERPL: 1.6 {RATIO} (ref 1.1–2.2)
ALP SERPL-CCNC: 90 U/L (ref 40–129)
ALT SERPL-CCNC: 20 U/L (ref 10–40)
ANION GAP SERPL CALCULATED.3IONS-SCNC: 12 MMOL/L (ref 3–16)
AST SERPL-CCNC: 24 U/L (ref 15–37)
BASOPHILS # BLD: 0.1 K/UL (ref 0–0.2)
BASOPHILS NFR BLD: 0.8 %
BILIRUB SERPL-MCNC: 0.4 MG/DL (ref 0–1)
BUN SERPL-MCNC: 9 MG/DL (ref 7–20)
CALCIUM SERPL-MCNC: 9 MG/DL (ref 8.3–10.6)
CHLORIDE SERPL-SCNC: 100 MMOL/L (ref 99–110)
CO2 SERPL-SCNC: 25 MMOL/L (ref 21–32)
CREAT SERPL-MCNC: 0.6 MG/DL (ref 0.6–1.1)
DEPRECATED RDW RBC AUTO: 14.8 % (ref 12.4–15.4)
EOSINOPHIL # BLD: 0.5 K/UL (ref 0–0.6)
EOSINOPHIL NFR BLD: 5.5 %
GFR SERPLBLD CREATININE-BSD FMLA CKD-EPI: >90 ML/MIN/{1.73_M2}
GLUCOSE SERPL-MCNC: 77 MG/DL (ref 70–99)
HCT VFR BLD AUTO: 40.1 % (ref 36–48)
HGB BLD-MCNC: 12.8 G/DL (ref 12–16)
LYMPHOCYTES # BLD: 2.3 K/UL (ref 1–5.1)
LYMPHOCYTES NFR BLD: 25.6 %
MCH RBC QN AUTO: 26.3 PG (ref 26–34)
MCHC RBC AUTO-ENTMCNC: 32 G/DL (ref 31–36)
MCV RBC AUTO: 82.3 FL (ref 80–100)
MONOCYTES # BLD: 0.5 K/UL (ref 0–1.3)
MONOCYTES NFR BLD: 5.8 %
NEUTROPHILS # BLD: 5.5 K/UL (ref 1.7–7.7)
NEUTROPHILS NFR BLD: 62.3 %
PLATELET # BLD AUTO: 232 K/UL (ref 135–450)
PMV BLD AUTO: 10.5 FL (ref 5–10.5)
POTASSIUM SERPL-SCNC: 3.7 MMOL/L (ref 3.5–5.1)
PROT SERPL-MCNC: 6.8 G/DL (ref 6.4–8.2)
RBC # BLD AUTO: 4.88 M/UL (ref 4–5.2)
SARS-COV-2 N GENE RESP QL NAA+PROBE: NOT DETECTED
SODIUM SERPL-SCNC: 137 MMOL/L (ref 136–145)
WBC # BLD AUTO: 8.9 K/UL (ref 4–11)

## 2024-07-12 DIAGNOSIS — B96.89 ACUTE BACTERIAL SINUSITIS: ICD-10-CM

## 2024-07-12 DIAGNOSIS — J01.90 ACUTE BACTERIAL SINUSITIS: ICD-10-CM

## 2024-07-12 LAB — BACTERIA THROAT AEROBE CULT: NORMAL

## 2024-07-12 RX ORDER — AMOXICILLIN AND CLAVULANATE POTASSIUM 875; 125 MG/1; MG/1
1 TABLET, FILM COATED ORAL 2 TIMES DAILY
Qty: 14 TABLET | Refills: 0 | Status: SHIPPED | OUTPATIENT
Start: 2024-07-12 | End: 2024-07-19

## 2024-07-15 DIAGNOSIS — J45.41 MODERATE PERSISTENT ASTHMA WITH ACUTE EXACERBATION: ICD-10-CM

## 2024-07-15 RX ORDER — UMECLIDINIUM BROMIDE AND VILANTEROL TRIFENATATE 62.5; 25 UG/1; UG/1
1 POWDER RESPIRATORY (INHALATION) DAILY
Qty: 60 EACH | Refills: 5 | Status: SHIPPED | OUTPATIENT
Start: 2024-07-15

## 2024-07-15 NOTE — TELEPHONE ENCOUNTER
Medication:   Requested Prescriptions     Pending Prescriptions Disp Refills    ANORO ELLIPTA 62.5-25 MCG/ACT inhaler [Pharmacy Med Name: ANORO ELLIPTA 62.5-25 MCG INH] 60 each 5     Sig: INHALE 1 PUFF BY MOUTH EVERY DAY       Last Filled:  10/26/23    Patient Phone Number: [phone] (home)     Last appt: 7/8/2024   Next appt: Visit date not found    Last Labs DM: No results found for: \"LABA1C\"  Last Lipid:   Lab Results   Component Value Date/Time    CHOL 168 05/31/2022 12:19 PM    TRIG 99 05/31/2022 12:19 PM    HDL 49 05/31/2022 12:19 PM     Last PSA: No results found for: \"PSA\"  Last Thyroid:   Lab Results   Component Value Date/Time    TSH 0.91 03/22/2021 11:33 AM

## 2024-07-31 DIAGNOSIS — J45.41 MODERATE PERSISTENT ASTHMA WITH ACUTE EXACERBATION: ICD-10-CM

## 2024-07-31 RX ORDER — BUDESONIDE AND FORMOTEROL FUMARATE DIHYDRATE 160; 4.5 UG/1; UG/1
2 AEROSOL RESPIRATORY (INHALATION) 2 TIMES DAILY
Qty: 10.2 G | Refills: 5 | Status: SHIPPED | OUTPATIENT
Start: 2024-07-31

## 2024-07-31 RX ORDER — ALBUTEROL SULFATE 90 UG/1
AEROSOL, METERED RESPIRATORY (INHALATION)
Qty: 18 EACH | Refills: 1 | Status: SHIPPED | OUTPATIENT
Start: 2024-07-31

## 2024-07-31 NOTE — TELEPHONE ENCOUNTER
Medication:   Requested Prescriptions     Pending Prescriptions Disp Refills    albuterol sulfate HFA (PROVENTIL;VENTOLIN;PROAIR) 108 (90 Base) MCG/ACT inhaler [Pharmacy Med Name: ALBUTEROL HFA (VENTOLIN) INH] 18 each 1     Sig: INHALE 2 PUFFS BY MOUTH EVERY 6 HOURS AS NEEDED FOR WHEEZE       Last Filled:  10/26/23    Patient Phone Number: [phone] (home)     Last appt: 7/8/2024   Next appt: 7/31/2024    Last Labs DM: No results found for: \"LABA1C\"  Last Lipid:   Lab Results   Component Value Date/Time    CHOL 168 05/31/2022 12:19 PM    TRIG 99 05/31/2022 12:19 PM    HDL 49 05/31/2022 12:19 PM     Last PSA: No results found for: \"PSA\"  Last Thyroid:   Lab Results   Component Value Date/Time    TSH 0.91 03/22/2021 11:33 AM

## 2024-07-31 NOTE — TELEPHONE ENCOUNTER
Patient comment: Pharmacy Has been trying to get this refilled since July 23   Medication:   Requested Prescriptions     Pending Prescriptions Disp Refills    budesonide-formoterol (SYMBICORT) 160-4.5 MCG/ACT AERO 10.2 g 5     Sig: Inhale 2 puffs into the lungs 2 times daily       Last Filled:  5/5/23    Patient Phone Number: [phone] (home)     Last appt: 7/8/2024   Next appt: Visit date not found    Last Labs DM: No results found for: \"LABA1C\"  Last Lipid:   Lab Results   Component Value Date/Time    CHOL 168 05/31/2022 12:19 PM    TRIG 99 05/31/2022 12:19 PM    HDL 49 05/31/2022 12:19 PM     Last PSA: No results found for: \"PSA\"  Last Thyroid:   Lab Results   Component Value Date/Time    TSH 0.91 03/22/2021 11:33 AM

## 2024-08-20 ENCOUNTER — PATIENT MESSAGE (OUTPATIENT)
Dept: FAMILY MEDICINE CLINIC | Age: 39
End: 2024-08-20

## 2024-08-20 DIAGNOSIS — B96.89 ACUTE BACTERIAL SINUSITIS: ICD-10-CM

## 2024-08-20 DIAGNOSIS — J01.90 ACUTE BACTERIAL SINUSITIS: ICD-10-CM

## 2024-08-20 RX ORDER — FLUTICASONE PROPIONATE 50 MCG
SPRAY, SUSPENSION (ML) NASAL
Qty: 1 EACH | Refills: 2 | Status: SHIPPED | OUTPATIENT
Start: 2024-08-20

## 2024-08-20 RX ORDER — CETIRIZINE HYDROCHLORIDE 10 MG/1
10 TABLET ORAL DAILY
Qty: 30 TABLET | Refills: 2 | Status: SHIPPED | OUTPATIENT
Start: 2024-08-20

## 2024-08-20 RX ORDER — CETIRIZINE HYDROCHLORIDE 10 MG/1
TABLET ORAL
Qty: 30 TABLET | Refills: 2 | OUTPATIENT
Start: 2024-08-20

## 2024-08-20 NOTE — TELEPHONE ENCOUNTER
Medication:   Requested Prescriptions     Pending Prescriptions Disp Refills    amoxicillin-clavulanate (AUGMENTIN) 875-125 MG per tablet [Pharmacy Med Name: AMOXICILLIN-CLAV 875-125MG TAB] 14 tablet 0     Sig: TAKE 1 TABLET BY MOUTH TWICE A DAY FOR 7 DAYS    cetirizine (ZYRTEC) 10 MG tablet [Pharmacy Med Name: CETIRIZINE HCL 10 MG TABLET] 30 tablet 2     Sig: TAKE 1 TABLET BY MOUTH EVERY DAY        Last Filled:      Patient Phone Number: [phone] (home)     Last appt: 7/8/2024   Next appt: Visit date not found    Last OARRS:        No data to display

## 2024-08-20 NOTE — TELEPHONE ENCOUNTER
Medication:   Requested Prescriptions     Pending Prescriptions Disp Refills    cetirizine (ZYRTEC) 10 MG tablet 30 tablet 2     Sig: Take 1 tablet by mouth daily    fluticasone (FLONASE) 50 MCG/ACT nasal spray 1 each 2     Sig: INSTILL 1-2 SPRAYS IN EACH NOSTRIL DAILY        Last Filled:      Patient Phone Number: [phone] (home)     Last appt: 7/8/2024   Next appt: Visit date not found    Last OARRS:        No data to display

## 2024-09-16 RX ORDER — CETIRIZINE HYDROCHLORIDE 10 MG/1
10 TABLET ORAL DAILY
Qty: 90 TABLET | Refills: 1 | Status: SHIPPED | OUTPATIENT
Start: 2024-09-16

## 2024-11-20 DIAGNOSIS — J45.41 MODERATE PERSISTENT ASTHMA WITH ACUTE EXACERBATION: ICD-10-CM

## 2024-11-20 RX ORDER — ALBUTEROL SULFATE 90 UG/1
INHALANT RESPIRATORY (INHALATION)
Qty: 18 EACH | Refills: 1 | Status: SHIPPED | OUTPATIENT
Start: 2024-11-20

## 2024-11-20 NOTE — TELEPHONE ENCOUNTER
Medication:   Requested Prescriptions     Pending Prescriptions Disp Refills    albuterol sulfate HFA (PROVENTIL;VENTOLIN;PROAIR) 108 (90 Base) MCG/ACT inhaler [Pharmacy Med Name: ALBUTEROL HFA (VENTOLIN) INH] 18 each 1     Sig: INHALE 2 PUFFS BY MOUTH EVERY 6 HOURS AS NEEDED FOR WHEEZING       Last Filled:  7/31/24    Patient Phone Number: [phone] (home)     Last appt: 7/8/2024   Next appt: Visit date not found    Last Labs DM: No results found for: \"LABA1C\"  Last Lipid:   Lab Results   Component Value Date/Time    CHOL 168 05/31/2022 12:19 PM    TRIG 99 05/31/2022 12:19 PM    HDL 49 05/31/2022 12:19 PM     Last PSA: No results found for: \"PSA\"  Last Thyroid:   Lab Results   Component Value Date/Time    TSH 0.91 03/22/2021 11:33 AM

## 2025-06-03 RX ORDER — FLUTICASONE PROPIONATE 50 MCG
SPRAY, SUSPENSION (ML) NASAL
Qty: 16 ML | Refills: 2 | Status: SHIPPED | OUTPATIENT
Start: 2025-06-03

## 2025-06-03 NOTE — TELEPHONE ENCOUNTER
Medication:   Requested Prescriptions     Pending Prescriptions Disp Refills    fluticasone (FLONASE) 50 MCG/ACT nasal spray [Pharmacy Med Name: FLUTICASONE PROP 50 MCG SPRAY] 16 mL 2     Sig: INSTILL 1-2 SPRAYS IN EACH NOSTRIL DAILY       Last Filled:      Patient Phone Number: [phone] (home)     Last appt: 7/8/2024   Next appt: Visit date not found    Last Labs DM: No results found for: \"LABA1C\"  Last Lipid:   Lab Results   Component Value Date/Time    CHOL 168 05/31/2022 12:19 PM    TRIG 99 05/31/2022 12:19 PM    HDL 49 05/31/2022 12:19 PM     Last PSA: No results found for: \"PSA\"  Last Thyroid:   Lab Results   Component Value Date/Time    TSH 0.91 03/22/2021 11:33 AM